# Patient Record
Sex: MALE | Race: WHITE | NOT HISPANIC OR LATINO | Employment: OTHER | URBAN - METROPOLITAN AREA
[De-identification: names, ages, dates, MRNs, and addresses within clinical notes are randomized per-mention and may not be internally consistent; named-entity substitution may affect disease eponyms.]

---

## 2024-03-04 ENCOUNTER — OFFICE VISIT (OUTPATIENT)
Dept: UROLOGY | Facility: CLINIC | Age: 69
End: 2024-03-04
Payer: MEDICARE

## 2024-03-04 ENCOUNTER — LAB VISIT (OUTPATIENT)
Dept: LAB | Facility: HOSPITAL | Age: 69
End: 2024-03-04
Attending: UROLOGY
Payer: MEDICARE

## 2024-03-04 VITALS
HEIGHT: 71 IN | RESPIRATION RATE: 16 BRPM | BODY MASS INDEX: 33.46 KG/M2 | SYSTOLIC BLOOD PRESSURE: 124 MMHG | HEART RATE: 18 BPM | DIASTOLIC BLOOD PRESSURE: 77 MMHG | WEIGHT: 239 LBS

## 2024-03-04 DIAGNOSIS — R31.0 HEMATURIA, GROSS: Primary | ICD-10-CM

## 2024-03-04 DIAGNOSIS — C61 PROSTATE CANCER: ICD-10-CM

## 2024-03-04 DIAGNOSIS — R31.0 HEMATURIA, GROSS: ICD-10-CM

## 2024-03-04 DIAGNOSIS — N39.3 SUI (STRESS URINARY INCONTINENCE), MALE: ICD-10-CM

## 2024-03-04 DIAGNOSIS — N52.31 ERECTILE DYSFUNCTION AFTER RADICAL PROSTATECTOMY: ICD-10-CM

## 2024-03-04 LAB
BILIRUB UR QL STRIP: NEGATIVE
COMPLEXED PSA SERPL-MCNC: <0.01 NG/ML (ref 0–4)
CREAT SERPL-MCNC: 0.9 MG/DL (ref 0.5–1.4)
EST. GFR  (NO RACE VARIABLE): >60 ML/MIN/1.73 M^2
GLUCOSE UR QL STRIP: NEGATIVE
KETONES UR QL STRIP: POSITIVE
LEUKOCYTE ESTERASE UR QL STRIP: NEGATIVE
PH, POC UA: 5.5
POC BLOOD, URINE: NEGATIVE
POC NITRATES, URINE: NEGATIVE
POC RESIDUAL URINE VOLUME: 56 ML (ref 0–100)
PROT UR QL STRIP: POSITIVE
SP GR UR STRIP: 1.03 (ref 1–1.03)
UROBILINOGEN UR STRIP-ACNC: 0.2 (ref 0.3–2.2)

## 2024-03-04 PROCEDURE — 36415 COLL VENOUS BLD VENIPUNCTURE: CPT | Performed by: UROLOGY

## 2024-03-04 PROCEDURE — 99205 OFFICE O/P NEW HI 60 MIN: CPT | Mod: PBBFAC,25 | Performed by: UROLOGY

## 2024-03-04 PROCEDURE — 99999PBSHW POCT BLADDER SCAN: Mod: PBBFAC,,,

## 2024-03-04 PROCEDURE — 51798 US URINE CAPACITY MEASURE: CPT | Mod: PBBFAC | Performed by: UROLOGY

## 2024-03-04 PROCEDURE — 99999PBSHW POCT URINALYSIS, DIPSTICK, AUTOMATED, W/O SCOPE: Mod: PBBFAC,,,

## 2024-03-04 PROCEDURE — 82565 ASSAY OF CREATININE: CPT | Performed by: UROLOGY

## 2024-03-04 PROCEDURE — 81003 URINALYSIS AUTO W/O SCOPE: CPT | Mod: PBBFAC | Performed by: UROLOGY

## 2024-03-04 PROCEDURE — 99214 OFFICE O/P EST MOD 30 MIN: CPT | Mod: S$PBB,,, | Performed by: UROLOGY

## 2024-03-04 PROCEDURE — 84153 ASSAY OF PSA TOTAL: CPT | Performed by: UROLOGY

## 2024-03-04 PROCEDURE — 99999 PR PBB SHADOW E&M-NEW PATIENT-LVL V: CPT | Mod: PBBFAC,,, | Performed by: UROLOGY

## 2024-03-04 RX ORDER — ATORVASTATIN CALCIUM 80 MG/1
80 TABLET, FILM COATED ORAL NIGHTLY
COMMUNITY
Start: 2023-08-28

## 2024-03-04 RX ORDER — GABAPENTIN 300 MG/1
300 CAPSULE ORAL 2 TIMES DAILY
COMMUNITY

## 2024-03-04 RX ORDER — AMOXICILLIN 500 MG/1
500 TABLET, FILM COATED ORAL
COMMUNITY

## 2024-03-04 RX ORDER — VALACYCLOVIR HYDROCHLORIDE 500 MG/1
500 TABLET, FILM COATED ORAL DAILY
COMMUNITY

## 2024-03-04 RX ORDER — NAPROXEN SODIUM 220 MG/1
1 TABLET, FILM COATED ORAL EVERY MORNING
COMMUNITY

## 2024-03-04 RX ORDER — NITROGLYCERIN 0.4 MG/1
0.4 TABLET SUBLINGUAL EVERY 5 MIN PRN
COMMUNITY
Start: 2023-04-27

## 2024-03-04 RX ORDER — HYDROCODONE BITARTRATE AND ACETAMINOPHEN 7.5; 325 MG/1; MG/1
1 TABLET ORAL 2 TIMES DAILY
COMMUNITY

## 2024-03-04 RX ORDER — LANOLIN ALCOHOL/MO/W.PET/CERES
500 CREAM (GRAM) TOPICAL DAILY
COMMUNITY

## 2024-03-04 RX ORDER — ESCITALOPRAM OXALATE 10 MG/1
10 TABLET ORAL NIGHTLY
COMMUNITY

## 2024-03-04 RX ORDER — MAGNESIUM GLUCONATE 27 MG(500)
1000 TABLET ORAL DAILY
COMMUNITY

## 2024-03-04 RX ORDER — PANTOPRAZOLE SODIUM 40 MG/1
40 TABLET, DELAYED RELEASE ORAL DAILY
COMMUNITY

## 2024-03-04 RX ORDER — CHOLECALCIFEROL (VITAMIN D3) 25 MCG
1000 TABLET ORAL DAILY
COMMUNITY

## 2024-03-04 RX ORDER — DICYCLOMINE HYDROCHLORIDE 20 MG/1
20 TABLET ORAL 2 TIMES DAILY
COMMUNITY
Start: 2023-12-28

## 2024-03-04 NOTE — PROGRESS NOTES
Subjective:      Patient ID: Steven Garcia is a 68 y.o. male.    Chief Complaint: Hematuria    He complaints of intermittent gross hematuria for the last few months.  Denies blood clots.  Denies dysuria.  Mild BARBIE that is stable.  He has some back pain but no significant flank pain.  He has ED.  He is currently not sexually active.     He was treated by me with a robotic prostatectomy and robotic bilateral pelvic lymphadenectomy for prostate cancer a few years ago.  He has been TRISTIN since his surgery.  He has history of kidney stones.    Hematuria  Irritative symptoms do not include urgency. Pertinent negatives include no chills, dysuria, fever, flank pain, nausea or vomiting.     Review of Systems   Constitutional:  Negative for chills and fever.   Respiratory:  Negative for shortness of breath.    Cardiovascular:  Negative for chest pain.   Gastrointestinal:  Negative for nausea and vomiting.   Genitourinary:  Positive for hematuria. Negative for difficulty urinating, dysuria, flank pain and urgency.   Musculoskeletal:  Positive for back pain.   Skin:  Negative for rash.   Neurological:  Negative for dizziness.   Psychiatric/Behavioral:  Negative for agitation.       Objective:     Physical Exam  Constitutional:       Appearance: Normal appearance.   Pulmonary:      Effort: Pulmonary effort is normal.   Abdominal:      General: There is no distension.      Palpations: Abdomen is soft.   Neurological:      Mental Status: He is alert and oriented to person, place, and time.   Psychiatric:         Mood and Affect: Mood normal.      Office Visit on 03/04/2024   Component Date Value Ref Range Status    POC Blood, Urine 03/04/2024 Negative  Negative Final    POC Bilirubin, Urine 03/04/2024 Negative  Negative Final    POC Urobilinogen, Urine 03/04/2024 0.2  0.3 - 2.2 Final    POC Ketones, Urine 03/04/2024 Positive (A)  Negative Final    POC Protein, Urine 03/04/2024 Positive (A)  Negative Final    POC Nitrates, Urine  03/04/2024 Negative  Negative Final    POC Glucose, Urine 03/04/2024 Negative  Negative Final    pH, UA 03/04/2024 5.5   Final    POC Specific Gravity, Urine 03/04/2024 1.030 (A)  1.003 - 1.029 Final    POC Leukocytes, Urine 03/04/2024 Negative  Negative Final    POC Residual Urine Volume 03/04/2024 56  0 - 100 mL Final      Results for orders placed or performed in visit on 03/04/24 (from the past 8760 hour(s))   POCT Bladder Scan   Result Value    POC Residual Urine Volume 56      Assessment:      1. Hematuria, gross    2. BARBIE (stress urinary incontinence), male    3. Prostate cancer    4. Erectile dysfunction after radical prostatectomy      Orders Placed This Encounter    CT Urogram Abd Pelvis W WO    PROSTATE SPECIFIC ANTIGEN, DIAGNOSTIC    CREATININE, SERUM    Ambulatory referral/consult to Physical/Occupational Therapy    POCT Urinalysis, Dipstick, Automated, W/O Scope    POCT Bladder Scan      Plan:     - CT urogram followed by cystoscopy in 2 weeks.  - Referral to [facility] floor PT.  - PSA and creatinine/BUN today.  - I prescribed him tri-mix ICI today, I taught him how to do the injections, and he was given written instructions on how to do the penile injections.  I told him to call the office if he needs any further instructions with the penile injections.

## 2024-04-12 ENCOUNTER — TELEPHONE (OUTPATIENT)
Dept: UROLOGY | Facility: CLINIC | Age: 69
End: 2024-04-12
Payer: MEDICARE

## 2024-04-12 DIAGNOSIS — R31.0 GROSS HEMATURIA: Primary | ICD-10-CM

## 2024-04-12 NOTE — TELEPHONE ENCOUNTER
I called and left a detailed message for the patient. I scheduled lab appointment for 4/15/2024.     ----- Message from Thong An MD sent at 4/12/2024  3:24 PM CDT -----  Regarding: RE: STAT Labs for CT  Please call Mr Garcia and let him know that the lab requires a normal creatinine within 30 days of his CT scan.  I placed this creatinine order.  See if he can do the creatinine now or Monday 4-15-24 morning.  Remind him of the details of his CT urogram and cystoscopy.  ----- Message -----  From: Beth Garrett MA  Sent: 4/12/2024  11:22 AM CDT  To: Thong An MD  Subject: FW: STAT Labs for CT                             Need an order for labs.   ----- Message -----  From: Lisa Shukla RT  Sent: 4/12/2024   8:40 AM CDT  To: [facility] Staff  Subject: STAT Labs for CT                                 Mr Garcia will need a STAT CREATININE SERUM ordered and booked at least 1 1/2 hours prior to his CT on Tuesday. If the patient prefers, he can come in any day before then to have them drawn so we will not have to wait on results the day of the scan.         Thanks,   Irene   [id number]

## 2024-04-15 ENCOUNTER — LAB VISIT (OUTPATIENT)
Dept: LAB | Facility: HOSPITAL | Age: 69
End: 2024-04-15
Attending: UROLOGY
Payer: MEDICARE

## 2024-04-15 DIAGNOSIS — R31.0 GROSS HEMATURIA: ICD-10-CM

## 2024-04-15 PROCEDURE — 82565 ASSAY OF CREATININE: CPT | Performed by: UROLOGY

## 2024-04-15 PROCEDURE — 36415 COLL VENOUS BLD VENIPUNCTURE: CPT | Performed by: UROLOGY

## 2024-04-16 ENCOUNTER — HOSPITAL ENCOUNTER (OUTPATIENT)
Dept: RADIOLOGY | Facility: HOSPITAL | Age: 69
Discharge: HOME | End: 2024-04-16
Attending: UROLOGY
Payer: MEDICARE

## 2024-04-16 DIAGNOSIS — R31.0 HEMATURIA, GROSS: ICD-10-CM

## 2024-04-16 LAB
CREAT SERPL-MCNC: 0.9 MG/DL (ref 0.5–1.4)
EST. GFR  (NO RACE VARIABLE): >60 ML/MIN/1.73 M^2

## 2024-04-16 PROCEDURE — 25500020 PHARM REV CODE 255: Performed by: UROLOGY

## 2024-04-16 PROCEDURE — 74178 CT ABD&PLV WO CNTR FLWD CNTR: CPT | Mod: 26,,, | Performed by: RADIOLOGY

## 2024-04-16 PROCEDURE — 74178 CT ABD&PLV WO CNTR FLWD CNTR: CPT | Mod: TC

## 2024-04-16 RX ADMIN — IOHEXOL 100 ML: 350 INJECTION, SOLUTION INTRAVENOUS at 01:04

## 2024-04-22 ENCOUNTER — HOSPITAL ENCOUNTER (OUTPATIENT)
Dept: CARDIOLOGY | Facility: HOSPITAL | Age: 69
Discharge: HOME | End: 2024-04-22
Attending: UROLOGY
Payer: MEDICARE

## 2024-04-22 ENCOUNTER — HOSPITAL ENCOUNTER (OUTPATIENT)
Dept: RADIOLOGY | Facility: HOSPITAL | Age: 69
Discharge: HOME | End: 2024-04-22
Attending: UROLOGY
Payer: MEDICARE

## 2024-04-22 ENCOUNTER — PROCEDURE VISIT (OUTPATIENT)
Dept: UROLOGY | Facility: CLINIC | Age: 69
End: 2024-04-22
Payer: MEDICARE

## 2024-04-22 VITALS
SYSTOLIC BLOOD PRESSURE: 132 MMHG | RESPIRATION RATE: 18 BRPM | DIASTOLIC BLOOD PRESSURE: 78 MMHG | BODY MASS INDEX: 34.11 KG/M2 | WEIGHT: 243.63 LBS | HEIGHT: 71 IN

## 2024-04-22 DIAGNOSIS — Z01.818 PRE-OP EXAM: ICD-10-CM

## 2024-04-22 DIAGNOSIS — D49.4 BLADDER NEOPLASM: Primary | ICD-10-CM

## 2024-04-22 DIAGNOSIS — R31.0 GROSS HEMATURIA: ICD-10-CM

## 2024-04-22 LAB
BILIRUB UR QL STRIP: NEGATIVE
GLUCOSE UR QL STRIP: NEGATIVE
KETONES UR QL STRIP: NEGATIVE
LEUKOCYTE ESTERASE UR QL STRIP: NEGATIVE
OHS QRS DURATION: 106 MS
OHS QTC CALCULATION: 420 MS
PH, POC UA: 5
POC BLOOD, URINE: NEGATIVE
POC NITRATES, URINE: NEGATIVE
PROT UR QL STRIP: POSITIVE
SP GR UR STRIP: >=1.03 (ref 1–1.03)
UROBILINOGEN UR STRIP-ACNC: 0.2 (ref 0.3–2.2)

## 2024-04-22 PROCEDURE — 81003 URINALYSIS AUTO W/O SCOPE: CPT | Mod: PBBFAC | Performed by: UROLOGY

## 2024-04-22 PROCEDURE — 88112 CYTOPATH CELL ENHANCE TECH: CPT | Mod: 26,,, | Performed by: PATHOLOGY

## 2024-04-22 PROCEDURE — 71046 X-RAY EXAM CHEST 2 VIEWS: CPT | Mod: 26,,, | Performed by: RADIOLOGY

## 2024-04-22 PROCEDURE — 71046 X-RAY EXAM CHEST 2 VIEWS: CPT | Mod: TC

## 2024-04-22 PROCEDURE — 93005 ELECTROCARDIOGRAM TRACING: CPT

## 2024-04-22 PROCEDURE — 88112 CYTOPATH CELL ENHANCE TECH: CPT | Performed by: PATHOLOGY

## 2024-04-22 PROCEDURE — 93010 ELECTROCARDIOGRAM REPORT: CPT | Mod: ,,, | Performed by: INTERNAL MEDICINE

## 2024-04-22 PROCEDURE — 99999PBSHW POCT URINALYSIS, DIPSTICK, AUTOMATED, W/O SCOPE: Mod: PBBFAC,,,

## 2024-04-22 PROCEDURE — 52000 CYSTOURETHROSCOPY: CPT | Mod: PBBFAC | Performed by: UROLOGY

## 2024-04-22 RX ORDER — LENALIDOMIDE 5 MG/1
5 CAPSULE ORAL
COMMUNITY

## 2024-04-22 RX ORDER — ALFUZOSIN HYDROCHLORIDE 10 MG/1
10 TABLET, EXTENDED RELEASE ORAL
COMMUNITY

## 2024-04-22 RX ORDER — OMEGA-3-ACID ETHYL ESTERS 1 G/1
1 CAPSULE, LIQUID FILLED ORAL EVERY MORNING
COMMUNITY

## 2024-04-22 NOTE — PROCEDURES
Cystoscopy    Date/Time: 4/22/2024 8:30 AM    Performed by: Thong nA MD  Authorized by: Thong An MD    Consent Done?:  Yes (Verbal) and Yes (Written)  Timeout: prior to procedure the correct patient, procedure, and site was verified    Prep: patient was prepped and draped in usual sterile fashion    Local anesthesia used?: Yes    Anesthesia:  Lidocaine jelly  Local anesthetic:  Topical anesthetic  Indications: hematuria    Position:  Supine  Anesthesia:  Lidocaine jelly  Preparation: Patient was prepped and draped in usual sterile fashion    Scope type:  Flexible cystoscope  Comments:      The flexible cystoscope was advanced through his urethra into his bladder.  The bladder was inspected in a systematic fashion.  His bilateral ureteral orifices are orthotopic and patent with clear efflux of urine.  There are a few small flat erythematous bladder lesions on the posterior wal of his bladder near the floor of his bladder just behind the trigone mostly in the midline.  No papillary tumor is seen.  No stone and no foreign body is seen.  His prostate is a surgically absent.  A bladder wash urine cytology was obtained.  The cystoscope was removed from his bladder.  The patient tolerated this procedure well.          4-16-24  CT urogram.  See report.  Kidneys demonstrate no enhancing mass with cysts present bilaterally including left peripelvic cysts. No hydronephrosis. Prominent right extrarenal pelvis. No nephrolithiasis. No distal urolithiasis. Opacified ureters and renal collecting systems demonstrate no persistent suspicious filling defect. No definite ureteral wall thickening.  Mild bladder base wall thickening which may be accentuated by incomplete distension and prostatectomy. Correlate with direct visualization.     I reviewed all of the above lab results.         3-4-24  PSA <0.01    4-15-24  Cr 0.9    I reviewed all of the above lab results.         Assessment:  - Intermittent gross hematuria  over the last few months.  He says he smoked about 3 years as a teenager.  He takes aspirin 81 mg daily.  - Cystoscopy today on 4-22-24 shows there are a few small flat erythematous bladder lesions on the posterior wal of his bladder near the floor of his bladder just behind the trigone mostly in the midline.    - Prostate cancer s/p a robotic radical prostatectomy and robotic bilateral pelvic lymphadenectomy by me on 9-20-21.  Path:  Prostate adenocarcinoma [facility] 3+4=7, 8% pattern 4, margins involved at the left lateral prostate (unifocal pattern 3 at the positive margin), perineural invasion is present, pT2N0.    - Detectable PSA of 1.440 on 1-5-24.  Pelvic MRI on 2-1-22 showed no soft tissue mass or nodule in the surgical bed.  No evidence for disease recurrence or lymphadenopathy.  PET/CT pylarify scan on 2-16-22 showed a suspicious 9 mm hypermetabolic left pelvic lymph node.  - He underwent pelvic radiation treatments with Dr Donaldson that he finished in May 2022.  He received his first lupron 22.5 mg IM injection on 2-25-22 and his final lupron 22.5 mg IM injection on 6-13-22.  - Mild BARBIE.  - ED.  I prescribed him tri-mix ICI on 3-4-24.  He says he did not get the tri-mix ICI filled because his wife is not going to him to use it.  - CAD.  He has 2 coronary stents.  He takes aspirin 81 mg daily.  Dr Robbie Castillo is his Cardiologist.  - History of a 6 mm right proximal ureteral stone s/p right USE with laser lithotriopsy with right ureteral stent exchange on 2-8-19.  His right ureteral stent was removed on 2-20-9.  Stone analysis on 2-8-19 was 100% uric acid.  - History of kidney stones.    Plan:  - I had a discussion with the patient after his cystoscopy of his findings of few small flat erythematous bladder lesions on the posterior wal of his bladder near the floor of his bladder just behind the trigone mostly in the midline.  I explained that this is likely radiation cystitis changes but that I cannot  completely rule out bladder cancer just on visual appearance.  The mutual decision was made to proceed to the OR for a cystoscopy with bladder biopsies with fulguration, bilateral retrograde pyelograms, possible bilateral ureteral stents placement, and any other indicated procedure.  Risks and benefits of the surgery were explained to the patient and the patient expressed understanding.  All questions were answered by me to the patient's apparent understanding and to the patient's apparent satisfaction.  Surgery consent was signed today by the patient.   - Referral to Dr Robbie Castillo, Cardiologist, for preop cardiac clearance and for clearance to stop blood thinners prior to his surgery.  - I referred him to [facility] floor physical therapy on 3-4-24.  He says he never received a phone call from [facility].  I asked my nurse to send another referred.  - I discussed dietary modifications with him today and I recommended he drink mostly water during the day.

## 2024-04-23 LAB
FINAL PATHOLOGIC DIAGNOSIS: NORMAL
Lab: NORMAL

## 2024-04-29 ENCOUNTER — TELEPHONE (OUTPATIENT)
Dept: PREADMISSION TESTING | Facility: HOSPITAL | Age: 69
End: 2024-04-29
Payer: MEDICARE

## 2024-04-29 NOTE — TELEPHONE ENCOUNTER
Pre op instructions reviewed with pt over telephone, verbalized understanding.    To confirm, Surgery is scheduled on 5/3/24. We will call you late afternoon the business day prior to surgery with your arrival time.    *Please report to the [facility] Lobby (1st Floor) located off of [facility] (2nd Entrance/Building on the left, in front of the flag pole).  Address: [address] [place] [address] [place]. [address]      INSTRUCTIONS IMPORTANT!!!  DO NOT Eat, Drink, or Smoke after 12 midnight unless instructed otherwise by your Surgeon. OK to brush teeth, no gum, candy or mints!    >>>MEDICATION INSTRUCTIONS<<<: Morning of Surgery, take small sip of water with ONLY these medications:  None     !!!STOP ALL Aspirins, NSAIDS, WEIGHT LOSS INJECTIONS/PILLS, Herbal supplements, & Vitamins 7 DAYS BEFORE SURGERY!!!    ____  Avoid Alcoholic beverages 3 days prior to surgery, as it can thin the blood.  ____  NO Acrylic/fake nails or nail polish worn day of surgery (specifically hand/arm & foot surgeries).  ____  NO powder, lotions, deodorants, oils or cream on body.  ____  Remove all jewelry & piercings & foreign objects before arrival & leave at home.  ____  Remove Dentures, Hearing Aids & Contact Lens prior to surgery.  ____  Bring photo ID and insurance information to hospital (Leave Valuables at Home).  ____  If going home the same day, arrange for a ride home. You will not be able to drive for 24 hrs if Anesthesia was used.   ____  Females (ages 11-60): may need to give a urine sample the morning of surgery; please see Pre op Nurse prior to using the restroom.  ____  Males: Stop ED medications (Viagra, Cialis) 24 hrs prior to surgery.  ____  Wear clean, loose fitting clothing to allow for dressings/ bandages.      Bathing Instructions:    -Shower with anti-bacterial Soap (Hibiclens or Dial) the night before surgery and the morning of.   -Do not use Hibiclens on your face or genitals.   -Apply clean clothes after  shower.  -Do not shave your face or body 2 days prior to surgery unless instructed otherwise by your Surgeon.  -Do not shave pubic hair 7 days prior to surgery (gyn pt's).    [facility] Vis[facility]/Ride Policy:  Only 2 adults allowed in pre op/recovery area during your procedure. You MUST HAVE A RIDE HOME from a responsible adult that you know and trust. Medical Transport, Uber or Lyft can ONLY be used if patient has a responsible adult to accompany them during ride home.       *Signs and symptoms of Infection Before or After Surgery:               !!!If you experience any fever, chills, nausea/ vomiting, foul odor/ excessive drainage from surgical site, flu-like symptoms, new wounds or cuts, PLEASE CALL THE SURGEON OFFICE at [phone] or SEND MESSAGE THROUGH [facility]!!!     *If you are running late the morning of surgery, please call the [facility] Surgery Dept @ [phone].     *Billing questions:  [phone]     Thank you,  -[facility] Pre Admit Dept.  [phone] or [phone]  M-F 7:30 am-4:00 pm (Closed Major Holidays)

## 2024-04-30 ENCOUNTER — TELEPHONE (OUTPATIENT)
Dept: UROLOGY | Facility: CLINIC | Age: 69
End: 2024-04-30
Payer: MEDICARE

## 2024-04-30 NOTE — TELEPHONE ENCOUNTER
Referral has been sent for Cardiology already.  I faxed referral to [facility] on 03/05/2024 and refaxed again today 04/30/2024.     ----- Message from Thong An MD sent at 4/22/2024  9:28 AM CDT -----  Please fax a referral to Dr Robbie Castillo, Cardiologist, for preop cardiac clearance and for clearance to stop blood thinners prior to his surgery.  Please sara him with the details of his surgery when he is scheduled.  I referred him to [facility] physical therapy on 3-4-24 for stress urinary incontinence.  He says he never received a phone call from [facility].  Please send another referred.

## 2024-05-02 ENCOUNTER — TELEPHONE (OUTPATIENT)
Dept: PREADMISSION TESTING | Facility: HOSPITAL | Age: 69
End: 2024-05-02
Payer: MEDICARE

## 2024-05-02 ENCOUNTER — TELEPHONE (OUTPATIENT)
Dept: UROLOGY | Facility: CLINIC | Age: 69
End: 2024-05-02
Payer: MEDICARE

## 2024-05-02 ENCOUNTER — ANESTHESIA EVENT (OUTPATIENT)
Dept: SURGERY | Facility: HOSPITAL | Age: 69
End: 2024-05-02
Payer: MEDICARE

## 2024-05-02 NOTE — TELEPHONE ENCOUNTER
"I received patient cardiac clearance, however, Mr. Peña last name is spelled as "Roberto". I tried calling cardiology again to have them fix it and the office was closed. I looked up cardiology office online and it has the facility closes at 4pm.      ----- Message from Thong An MD sent at 5/2/2024 12:38 PM CDT -----  Regarding: RE: Cardiac clearance  Contact: [place] [facility]  I sent a request to my nursing staff at the patient's last office visit for a referral to Dr Robbie Castillo, Cardiologist, for preop cardiac clearance and for clearance to stop blood thinners prior to his surgery.  ----- Message -----  From: Beth Garrett MA  Sent: 5/2/2024  11:57 AM CDT  To: Thong An MD  Subject: Cardiac clearance                                I called and spoke with Ralphyves, I informed her that patient is having a procedure done tomorrow and we would need cardiac clearance. I was informed that the patient recently saw cardiologist but failed to mention he had a procedure scheduled for tomorrow. Mercedes is going to talk with Dr. Castillo and see if patient can be cleared for the procedure for tomorrow.  ----- Message -----  From: Rosalinda Tobin  Sent: 5/2/2024  11:44 AM CDT  To: [facility] Long is requesting a call in regards to patient. Please call her back at  [phone]. Thanks KB  "

## 2024-05-02 NOTE — TELEPHONE ENCOUNTER
Called and spoke with Pt about the following:     Please arrive to [facility] ([facility] at 5:30 am on 5/03/24 for your scheduled procedure.  Address: [address] [place] [address] [place]. [address] (2nd Building on left, 1st Floor Lobby)  >>>NO eating or drinking after midnight unless instructed otherwise by your Surgeon<<<

## 2024-05-02 NOTE — TELEPHONE ENCOUNTER
I called and spoke with Mercedes, I informed her that patient is having a procedure done tomorrow and we would need cardiac clearance. I was informed that the patient recently saw cardiologist but failed to mention he had a procedure scheduled for tomorrow. Mercedes is going to talk with Dr. Castillo and see if patient can be cleared for the procedure for tomorrow.     ----- Message from Rosalinda Tobin sent at 5/2/2024 11:41 AM CDT -----  Contact: Christianne Long is requesting a call in regards to patient. Please call her back at  [phone]. Thanks KB

## 2024-05-02 NOTE — ANESTHESIA PREPROCEDURE EVALUATION
05/02/2024  Steven Garcia is a 69 y.o., male with a past medical history of coronary artery disease status post PCI with thrombectomy and CHANTELLE of the RCA for non-STEMI July 2018 (no significant left system CAD) followed by PTCA of moderate ISR of RCA November 2018, normal LVEF, hyperlipidemia, multiple myeloma in remission, and prostate cancer status post prostatectomy September 2021 followed by XRT in remission     There is no problem list on file for this patient.    Past Surgical History:   Procedure Laterality Date    KIDNEY STONE SURGERY      LIMBAL STEM CELL TRANSPLANT      2016    PROSTATECTOMY      Prostatectomy w/ Pelvic Lymphadenectomy.       Pre-op Assessment    I have reviewed the Patient Summary Reports.    I have reviewed the NPO Status.   I have reviewed the Medications.     Review of Systems  Anesthesia Hx:  No problems with previous Anesthesia                Social:  Former Smoker       Hematology/Oncology:  Hematology Normal                                     Cardiovascular:       Past MI CAD   CABG/stent        hyperlipidemia    Seen by cards 3/24:  Status post PCI with CHANTELLE to RCA for non-STEMI in July 2018 followed by PTCA of moderate ISR of RCA November 2018.  Hospitalization September 2021 for chest pain, ruled out for MI, LHC with stable nonobstructive CAD and patent mid RCA stent.  Remains asymptomatic and EKG stable.  Continue aspirin and high intensity statin Lipitor.  Previously intolerant to beta-blocker.                         Pulmonary:        Sleep Apnea                Renal/:  Renal/ Normal                 Hepatic/GI:  Hepatic/GI Normal                 Neurological:  Neurology Normal                                      Endocrine:  Endocrine Normal                Physical Exam  General: Well nourished    Airway:  Mallampati: II   Mouth Opening: Normal  TM Distance:  Normal  Neck ROM: Normal ROM    Dental:  Intact        Anesthesia Plan  Type of Anesthesia, risks & benefits discussed:    Anesthesia Type: Gen ETT, Gen Supraglottic Airway  Intra-op Monitoring Plan: Standard ASA Monitors  Post Op Pain Control Plan: multimodal analgesia  Induction:  IV  Airway Plan: , Post-Induction  Informed Consent: Informed consent signed with the Patient and all parties understand the risks and agree with anesthesia plan.  All questions answered.   ASA Score: 3    Ready For Surgery From Anesthesia Perspective.     .      Chemistry        Component Value Date/Time     04/22/2024 0949    K 4.4 04/22/2024 0949     04/22/2024 0949    CO2 29 04/22/2024 0949    BUN 18 04/22/2024 0949    CREATININE 0.9 04/22/2024 0949     04/22/2024 0949        Component Value Date/Time    CALCIUM 9.4 04/22/2024 0949    ALKPHOS 74 04/22/2024 0949    AST 27 04/22/2024 0949    ALT 44 04/22/2024 0949    BILITOT 0.7 04/22/2024 0949        Lab Results   Component Value Date    WBC 5.38 04/22/2024    HGB 15.4 04/22/2024    HCT 44.5 04/22/2024    MCV 94 04/22/2024     (L) 04/22/2024       Normal sinus rhythm   Left axis deviation   Moderate voltage criteria for LVH, may be normal variant ( R in aVL ,   William product )   T wave abnormality, consider lateral ischemia   Abnormal ECG   No previous ECGs available   Confirmed by Beka Moffett MD ([id number]) on 4/22/2024 6:26:52 PM

## 2024-05-03 ENCOUNTER — TELEPHONE (OUTPATIENT)
Dept: UROLOGY | Facility: CLINIC | Age: 69
End: 2024-05-03
Payer: MEDICARE

## 2024-05-03 ENCOUNTER — ANESTHESIA (OUTPATIENT)
Dept: SURGERY | Facility: HOSPITAL | Age: 69
End: 2024-05-03
Payer: MEDICARE

## 2024-05-03 ENCOUNTER — HOSPITAL ENCOUNTER (OUTPATIENT)
Facility: HOSPITAL | Age: 69
Discharge: HOME | End: 2024-05-03
Attending: UROLOGY | Admitting: UROLOGY
Payer: MEDICARE

## 2024-05-03 DIAGNOSIS — D49.4 BLADDER NEOPLASM: ICD-10-CM

## 2024-05-03 PROCEDURE — 37000008 HC ANESTHESIA 1ST 15 MINUTES: Performed by: UROLOGY

## 2024-05-03 PROCEDURE — 74420 UROGRAPHY RTRGR +-KUB: CPT | Mod: 26,,, | Performed by: UROLOGY

## 2024-05-03 PROCEDURE — 25000003 PHARM REV CODE 250: Performed by: UROLOGY

## 2024-05-03 PROCEDURE — 36000707: Performed by: UROLOGY

## 2024-05-03 PROCEDURE — C1758 CATHETER, URETERAL: HCPCS | Performed by: UROLOGY

## 2024-05-03 PROCEDURE — 71000015 HC POSTOP RECOV 1ST HR: Performed by: UROLOGY

## 2024-05-03 PROCEDURE — 52204 CYSTOSCOPY W/BIOPSY(S): CPT | Mod: ,,, | Performed by: UROLOGY

## 2024-05-03 PROCEDURE — 25000003 PHARM REV CODE 250: Performed by: NURSE ANESTHETIST, CERTIFIED REGISTERED

## 2024-05-03 PROCEDURE — 88305 TISSUE EXAM BY PATHOLOGIST: CPT | Mod: 26,,, | Performed by: PATHOLOGY

## 2024-05-03 PROCEDURE — 36000706: Performed by: UROLOGY

## 2024-05-03 PROCEDURE — 71000033 HC RECOVERY, INTIAL HOUR: Performed by: UROLOGY

## 2024-05-03 PROCEDURE — 25500020 PHARM REV CODE 255: Performed by: UROLOGY

## 2024-05-03 PROCEDURE — 88305 TISSUE EXAM BY PATHOLOGIST: CPT | Performed by: PATHOLOGY

## 2024-05-03 PROCEDURE — 63600175 PHARM REV CODE 636 W HCPCS: Performed by: NURSE ANESTHETIST, CERTIFIED REGISTERED

## 2024-05-03 PROCEDURE — 37000009 HC ANESTHESIA EA ADD 15 MINS: Performed by: UROLOGY

## 2024-05-03 PROCEDURE — 63600175 PHARM REV CODE 636 W HCPCS: Performed by: UROLOGY

## 2024-05-03 RX ORDER — ONDANSETRON HYDROCHLORIDE 2 MG/ML
4 INJECTION, SOLUTION INTRAVENOUS DAILY PRN
Status: DISCONTINUED | OUTPATIENT
Start: 2024-05-03 | End: 2024-05-03 | Stop reason: HOSPADM

## 2024-05-03 RX ORDER — DEXAMETHASONE SODIUM PHOSPHATE 4 MG/ML
INJECTION, SOLUTION INTRA-ARTICULAR; INTRALESIONAL; INTRAMUSCULAR; INTRAVENOUS; SOFT TISSUE
Status: DISCONTINUED | OUTPATIENT
Start: 2024-05-03 | End: 2024-05-03

## 2024-05-03 RX ORDER — LIDOCAINE HYDROCHLORIDE 20 MG/ML
JELLY TOPICAL
Status: DISCONTINUED | OUTPATIENT
Start: 2024-05-03 | End: 2024-05-03 | Stop reason: HOSPADM

## 2024-05-03 RX ORDER — FENTANYL CITRATE 50 UG/ML
INJECTION, SOLUTION INTRAMUSCULAR; INTRAVENOUS
Status: DISCONTINUED | OUTPATIENT
Start: 2024-05-03 | End: 2024-05-03

## 2024-05-03 RX ORDER — HYDROMORPHONE HYDROCHLORIDE 2 MG/ML
0.2 INJECTION, SOLUTION INTRAMUSCULAR; INTRAVENOUS; SUBCUTANEOUS EVERY 5 MIN PRN
Status: DISCONTINUED | OUTPATIENT
Start: 2024-05-03 | End: 2024-05-03 | Stop reason: HOSPADM

## 2024-05-03 RX ORDER — PHENAZOPYRIDINE HYDROCHLORIDE 100 MG/1
100 TABLET, FILM COATED ORAL 3 TIMES DAILY PRN
Qty: 20 TABLET | Refills: 0 | Status: SHIPPED | OUTPATIENT
Start: 2024-05-03 | End: 2024-05-13

## 2024-05-03 RX ORDER — ROCURONIUM BROMIDE 10 MG/ML
INJECTION, SOLUTION INTRAVENOUS
Status: DISCONTINUED | OUTPATIENT
Start: 2024-05-03 | End: 2024-05-03

## 2024-05-03 RX ORDER — MIDAZOLAM HYDROCHLORIDE 1 MG/ML
INJECTION INTRAMUSCULAR; INTRAVENOUS
Status: DISCONTINUED | OUTPATIENT
Start: 2024-05-03 | End: 2024-05-03

## 2024-05-03 RX ORDER — KETOROLAC TROMETHAMINE 30 MG/ML
15 INJECTION, SOLUTION INTRAMUSCULAR; INTRAVENOUS EVERY 8 HOURS PRN
Status: DISCONTINUED | OUTPATIENT
Start: 2024-05-03 | End: 2024-05-03 | Stop reason: HOSPADM

## 2024-05-03 RX ORDER — PROPOFOL 10 MG/ML
VIAL (ML) INTRAVENOUS
Status: DISCONTINUED | OUTPATIENT
Start: 2024-05-03 | End: 2024-05-03

## 2024-05-03 RX ORDER — OXYCODONE AND ACETAMINOPHEN 5; 325 MG/1; MG/1
1 TABLET ORAL
Status: DISCONTINUED | OUTPATIENT
Start: 2024-05-03 | End: 2024-05-03 | Stop reason: HOSPADM

## 2024-05-03 RX ORDER — LIDOCAINE HYDROCHLORIDE 20 MG/ML
INJECTION INTRAVENOUS
Status: DISCONTINUED | OUTPATIENT
Start: 2024-05-03 | End: 2024-05-03

## 2024-05-03 RX ORDER — ONDANSETRON HYDROCHLORIDE 2 MG/ML
INJECTION, SOLUTION INTRAVENOUS
Status: DISCONTINUED | OUTPATIENT
Start: 2024-05-03 | End: 2024-05-03

## 2024-05-03 RX ADMIN — MIDAZOLAM HYDROCHLORIDE 2 MG: 1 INJECTION, SOLUTION INTRAMUSCULAR; INTRAVENOUS at 07:05

## 2024-05-03 RX ADMIN — ROCURONIUM BROMIDE 50 MG: 10 INJECTION, SOLUTION INTRAVENOUS at 07:05

## 2024-05-03 RX ADMIN — PROPOFOL 100 MG: 10 INJECTION, EMULSION INTRAVENOUS at 07:05

## 2024-05-03 RX ADMIN — SODIUM CHLORIDE, SODIUM LACTATE, POTASSIUM CHLORIDE, AND CALCIUM CHLORIDE: .6; .31; .03; .02 INJECTION, SOLUTION INTRAVENOUS at 07:05

## 2024-05-03 RX ADMIN — CEFTRIAXONE SODIUM 1 G: 1 INJECTION, POWDER, FOR SOLUTION INTRAMUSCULAR; INTRAVENOUS at 07:05

## 2024-05-03 RX ADMIN — SUGAMMADEX 200 MG: 100 INJECTION, SOLUTION INTRAVENOUS at 07:05

## 2024-05-03 RX ADMIN — DEXAMETHASONE SODIUM PHOSPHATE 8 MG: 4 INJECTION, SOLUTION INTRA-ARTICULAR; INTRALESIONAL; INTRAMUSCULAR; INTRAVENOUS; SOFT TISSUE at 07:05

## 2024-05-03 RX ADMIN — FENTANYL CITRATE 100 MCG: 50 INJECTION, SOLUTION INTRAMUSCULAR; INTRAVENOUS at 07:05

## 2024-05-03 RX ADMIN — ONDANSETRON 4 MG: 2 INJECTION INTRAMUSCULAR; INTRAVENOUS at 07:05

## 2024-05-03 RX ADMIN — LIDOCAINE HYDROCHLORIDE 100 MG: 20 INJECTION INTRAVENOUS at 07:05

## 2024-05-03 NOTE — ANESTHESIA PROCEDURE NOTES
Intubation    Date/Time: 5/3/2024 7:18 AM    Performed by: Jayden Nunez CRNA  Authorized by: Yann Abdi II, MD    Intubation:     Induction:  Intravenous    Intubated:  Postinduction    Mask Ventilation:  Easy mask    Attempts:  1    Attempted By:  CRNA    Method of Intubation:  Direct    Blade:  Manoj 3    Laryngeal View Grade: Grade IIA - cords partially seen      Difficult Airway Encountered?: No      Complications:  None    Airway Device:  Oral endotracheal tube    Airway Device Size:  7.5    Style/Cuff Inflation:  Cuffed (inflated to minimal occlusive pressure)    Inflation Amount (mL):  8    Tube secured:  22    Secured at:  The lips    Placement Verified By:  Capnometry    Complicating Factors:  None    Findings Post-Intubation:  BS equal bilateral

## 2024-05-03 NOTE — TELEPHONE ENCOUNTER
Patient wife was aware of pyridium.     ----- Message from Sarah Buchananry sent at 5/3/2024  9:54 AM CDT -----  .Type:  Patient Call Back    Who Called: PT WIFE       Does the patient know what this is regarding?: SHE CALLED STATING THE PHARMACY HASN'T RECEIVED PT PRESCRIPTIONS. PLEASE ADVISE     Would the patient rather a call back YES     Best Call Back Number: [phone]     Additional Information:     [id number] - [place] - [address]   Phone: [phone]  Fax: [phone]    Thank You

## 2024-05-03 NOTE — TRANSFER OF CARE
"Anesthesia Transfer of Care Note    Patient: Steven Garcia    Procedure(s) Performed: Procedure(s) (LRB):  CYSTOSCOPY, WITH BLADDER BIOPSY, WITH FULGURATION IF INDICATED (Bilateral)  CYSTOSCOPY, WITH RETROGRADE PYELOGRAM (Bilateral)    Patient location: PACU    Anesthesia Type: general    Transport from OR: Transported from OR on room air with adequate spontaneous ventilation    Post pain: adequate analgesia    Post assessment: no apparent anesthetic complications    Post vital signs: stable    Level of consciousness: awake    Nausea/Vomiting: no nausea/vomiting    Complications: none    Transfer of care protocol was followed      Last vitals: Visit Vitals  BP (!) 145/83   Pulse (!) 58   Temp 36.2 °C (97.2 °F) (Temporal)   Resp 18   Ht 5' 11" (1.803 m)   Wt 111.1 kg (245 lb)   SpO2 97%   BMI 34.17 kg/m²     "

## 2024-05-03 NOTE — ANESTHESIA POSTPROCEDURE EVALUATION
Anesthesia Post Evaluation    Patient: Steven Garcia    Procedure(s) Performed: Procedure(s) (LRB):  CYSTOSCOPY, WITH BLADDER BIOPSY, WITH FULGURATION IF INDICATED (Bilateral)  CYSTOSCOPY, WITH RETROGRADE PYELOGRAM (Bilateral)    Final Anesthesia Type: general      Patient location during evaluation: PACU  Patient participation: Yes- Able to Participate  Level of consciousness: awake and alert  Post-procedure vital signs: reviewed and stable  Pain management: adequate  Airway patency: patent  ELYSE mitigation strategies: Verification of full reversal of neuromuscular block  PONV status at discharge: No PONV  Anesthetic complications: no      Cardiovascular status: hemodynamically stable  Respiratory status: spontaneous ventilation  Hydration status: euvolemic  Follow-up not needed.              Vitals Value Taken Time   /61 05/03/24 0819   Temp 36.3 °C (97.3 °F) 05/03/24 0818   Pulse 68 05/03/24 0820   Resp 15 05/03/24 0819   SpO2 92 % 05/03/24 0820   Vitals shown include unfiled device data.      Event Time   Out of Recovery 08:22:00         Pain/Yonis Score: Yonis Score: 10 (5/3/2024  8:15 AM)

## 2024-05-03 NOTE — OP NOTE
Surgeon:  Dr Thong An.    Date:  5-3-2024.    Pre operative diagnosis:  1) Bladder lesion.  2) Gross hematuria.    Post operative diagnosis:  1) Bladder lesion.  2) Gross hematuria.     Surgery:  1) Cystoscopy with bladder biopsies with fulguration.  2) Bilateral retrograde pyelograms.    Anesthesia:  General.    Estimated blood loss:  1 ml.    Complications:  None.    Specimens:  Bladder lesion for permanent specimen.    Procedure in details:  Risks and benefits of the surgery were explained to the patient prior to the surgery and the patient expressed understanding.  All questions were answered by me to the patient's apparent understanding and to the patient's apparent satisfaction.  Surgery consent was signed by the patient prior to the surgery.  The patient was taken to the OR and placed in the supine position initially.  Anesthesia was administered by the Anesthesia team.  The patient was then placed in the dorsal lithotomy position.  The patient was prepped and drapped in the usual sterile fashion.  An IV antibiotic was given to the patient prior to the surgery.  A timeout was done prior to the surgery.  A 21 F rigid cystoscope was advanced through the urethra into his bladder.  The bladder was inspected in a systematic fashion.   His bilateral ureteral orifices are orthotopic and patent with clear efflux.  An approximately 1.5 cm flat erythematous lesion was seen in the midline of the posterior wall of his bladder.  No urothelial lesion, tumor, stone, or foreign body was seen.  I used the biopsy forceps and I performed three bladder biopsies of this flat erythematous lesion was seen in the midline of the posterior wall of his bladder.  Once this bladder lesion was no longer seen visually, I used the bugbee to fulgurate this bladder biopsy site area.  I turned down the flow of fluid and there was no bleeding from his bladder biopsy site.  No visible tumor or abnormal urothelial lesion was seen at this time.   His right ureteral orifice was cannulated with a 5 F open ended ureteral catheter and a gentle right retrograde pyelogram was shot.  This was normal with no filling defect, no hydronephrosis, no extravasation, and good drainage of his right kidney.  His left ureteral orifice was cannulated with a 5 F open ended ureteral catheter and a gentle left retrograde pyelogram was shot.  This was normal with no filling defect, no hydronephrosis, no extravasation, and good drainage of his left kidney.  I checked the bladder biopsy site area again and there was no bleeding from this area or another other area.  The cystoscope was then used to completely drain his bladder and the cystoscope was removed from his body.  A post operative timeout was done at the end of the procedure.  The patient was taken to the recovery room in stable condition at the end of the procedure.

## 2024-05-06 VITALS
RESPIRATION RATE: 18 BRPM | SYSTOLIC BLOOD PRESSURE: 122 MMHG | TEMPERATURE: 97 F | WEIGHT: 245 LBS | OXYGEN SATURATION: 94 % | DIASTOLIC BLOOD PRESSURE: 60 MMHG | BODY MASS INDEX: 34.3 KG/M2 | HEART RATE: 68 BPM | HEIGHT: 71 IN

## 2024-05-07 LAB
FINAL PATHOLOGIC DIAGNOSIS: NORMAL
GROSS: NORMAL
Lab: NORMAL

## 2024-05-09 RX ORDER — PHENAZOPYRIDINE HYDROCHLORIDE 100 MG/1
100 TABLET, FILM COATED ORAL 3 TIMES DAILY PRN
Qty: 30 TABLET | Refills: 1 | Status: SHIPPED | OUTPATIENT
Start: 2024-05-09 | End: 2024-05-19

## 2024-05-15 ENCOUNTER — OFFICE VISIT (OUTPATIENT)
Dept: UROLOGY | Facility: CLINIC | Age: 69
End: 2024-05-15
Payer: MEDICARE

## 2024-05-15 VITALS
HEIGHT: 71 IN | RESPIRATION RATE: 16 BRPM | SYSTOLIC BLOOD PRESSURE: 130 MMHG | WEIGHT: 244.94 LBS | DIASTOLIC BLOOD PRESSURE: 76 MMHG | HEART RATE: 64 BPM | BODY MASS INDEX: 34.29 KG/M2

## 2024-05-15 DIAGNOSIS — N39.3 SUI (STRESS URINARY INCONTINENCE), MALE: ICD-10-CM

## 2024-05-15 DIAGNOSIS — C61 PROSTATE CANCER: ICD-10-CM

## 2024-05-15 DIAGNOSIS — N30.40 RADIATION CYSTITIS: ICD-10-CM

## 2024-05-15 DIAGNOSIS — R31.0 INTERMITTENT GROSS HEMATURIA: Primary | ICD-10-CM

## 2024-05-15 PROCEDURE — 99214 OFFICE O/P EST MOD 30 MIN: CPT | Mod: S$PBB,,, | Performed by: UROLOGY

## 2024-05-15 PROCEDURE — 99214 OFFICE O/P EST MOD 30 MIN: CPT | Mod: PBBFAC | Performed by: UROLOGY

## 2024-05-15 PROCEDURE — 99999 PR PBB SHADOW E&M-EST. PATIENT-LVL IV: CPT | Mod: PBBFAC,,, | Performed by: UROLOGY

## 2024-05-15 NOTE — PROGRESS NOTES
Subjective:       Patient ID: Steven Garcia is a 69 y.o. male.    Chief Complaint: Post-op Evaluation      History of Present Illness:     Mr Garcia is here today with his wife.  He has had ntermittent gross hematuria over the last few months.  He says he has a small amount of gross hematuria 3 days ago but none since.  He takes aspirin 81 mg daily.  He is s/p cystoscopy with bladder biopsies with fulguration of an approximately 1.5 cm flat erythematous lesion was seen in the midline of the posterior wall of his bladder and normal bilateral retrograde pyelograms on 5-3-24.  Pathology showed atypical urothelial mucosa with chronic inflammation and partial denudation, favor reactive changes Muscularis propria is present.    Mr Garcia is s/p a robotic radical prostatectomy and robotic bilateral pelvic lymphadenectomy by me on 9-20-21.  He had a detectable PSA of 1.440 on 1-5-24.  Pelvic MRI on 2-1-22 showed no soft tissue mass or nodule in the surgical bed.  No evidence for disease recurrence or lymphadenopathy.  PET/CT pylarify scan on 2-16-22 showed a suspicious 9 mm hypermetabolic left pelvic lymph node.  He underwent pelvic radiation treatments with Dr Donaldson that he finished in May 2022.  He received his first lupron 22.5 mg IM injection on 2-25-22 and his final lupron 22.5 mg IM injection on 6-13-22.  He has mild BARBIE.  He goes through 2 diaper daily.  He says he has an appointment at [facility] pelvic floor physical therapy.         Past Medical History:   Diagnosis Date    CAD (coronary artery disease)     Cancer     jaw    Encounter for blood transfusion     platelets    Heart attack     Had one in July, one in November 2016 or 2017.    History of heart artery stent     Currently has two stents and a balloon.    HLD (hyperlipidemia)     Hx of cholecystectomy     Had when he was 17 yrs old    Multiple myeloma     radiation treated/ chemo    Sleep apnea      No family history on file.  Social History      Socioeconomic History    Marital status:    Tobacco Use    Smoking status: Former     Types: Cigarettes    Smokeless tobacco: Never    Tobacco comments:     Stopped smoking 18 years ago.    Substance and Sexual Activity    Alcohol use: Not Currently     Comment: Drinks 2 margaritas    Drug use: Never    Sexual activity: Not Currently     Outpatient Encounter Medications as of 5/15/2024   Medication Sig Dispense Refill    alfuzosin (UROXATRAL) 10 mg Tb24 Take 10 mg by mouth daily with breakfast.      amoxicillin (AMOXIL) 500 MG Tab Take 500 mg by mouth. Takes it every 3 to 4 months prior to dental appointment      aspirin 81 MG Chew Take 1 tablet by mouth every morning.      atorvastatin (LIPITOR) 80 MG tablet Take 80 mg by mouth every evening.      cyanocobalamin (VITAMIN B-12) 1000 MCG tablet Take 500 mcg by mouth once daily.      dicyclomine (BENTYL) 20 mg tablet Take 20 mg by mouth 2 (two) times daily.      EScitalopram oxalate (LEXAPRO) 10 MG tablet Take 10 mg by mouth every evening.      gabapentin (NEURONTIN) 300 MG capsule Take 300 mg by mouth 2 (two) times daily.      HYDROcodone-acetaminophen (NORCO) 7.5-325 mg per tablet Take 1 tablet by mouth 2 (two) times a day.      lenalidomide 5 mg Cap Take 5 mg by mouth.      magnesium gluconate (MAG-G) 27 mg magnesium (500 mg) Tab tablet Take 1,000 mg by mouth once daily.      nitroGLYCERIN (NITROSTAT) 0.4 MG SL tablet Place 0.4 mg under the tongue every 5 (five) minutes as needed.      omega-3 acid ethyl esters (LOVAZA) 1 gram capsule Take 1 capsule by mouth every morning.      pantoprazole (PROTONIX) 40 MG tablet Take 40 mg by mouth once daily.      phenazopyridine (PYRIDIUM) 100 MG tablet Take 1 tablet (100 mg total) by mouth 3 (three) times daily as needed for Pain (Take 1 by mouth every 8 hours as needed for burning with urination). 30 tablet 1    valACYclovir (VALTREX) 500 MG tablet Take 500 mg by mouth once daily.      vitamin D (VITAMIN D3) 1000  "units Tab Take 1,000 Units by mouth once daily.      [] phenazopyridine (PYRIDIUM) 100 MG tablet Take 1 by mouth every 8 hours as needed for burning and/or pain with urination and/or bladder spasms). 20 tablet 0    [] phenazopyridine (PYRIDIUM) 100 MG tablet Take 1 tablet (100 mg total) by mouth 3 (three) times daily as needed for Pain (Take 1 by mouth every 8 hours as needed for burning and/or pain with urination and/or bladder spasms.). 20 tablet 0     No facility-administered encounter medications on file as of 5/15/2024.        Review of Systems   Constitutional:  Negative for chills and fever.   Respiratory:  Negative for shortness of breath.    Cardiovascular:  Negative for chest pain.   Gastrointestinal:  Negative for nausea and vomiting.   Genitourinary:  Positive for hematuria. Negative for difficulty urinating.   Musculoskeletal:  Negative for back pain.   Skin:  Negative for rash.   Neurological:  Negative for dizziness.   Psychiatric/Behavioral:  Negative for agitation.        Objective:     /76 (BP Location: Right arm, Patient Position: Sitting)   Pulse 64   Resp 16   Ht 5' 11" (1.803 m)   Wt 111.1 kg (244 lb 14.9 oz)   BMI 34.16 kg/m²     Physical Exam  Constitutional:       Appearance: Normal appearance.   Pulmonary:      Effort: Pulmonary effort is normal.   Abdominal:      Palpations: Abdomen is soft.   Neurological:      Mental Status: He is alert and oriented to person, place, and time.   Psychiatric:         Mood and Affect: Mood normal.         No visits with results within 1 Day(s) from this visit.   Latest known visit with results is:   Admission on 2024, Discharged on 2024   Component Date Value Ref Range Status    Final Pathologic Diagnosis 2024    Final                    Value:Bladder, posterior wall, lesion, biopsy:  - Atypical urothelial mucosa with chronic inflammation and partial denudation, favor reactive changes  - Muscularis propria is " present   - Multiple additional levels have been examined      Interp By GÉNESIS Harmon MD, Signed on 05/07/2024 at 11:11    Gross 05/03/2024    Final                    Value:Surgery ID:  [id number]   Pathology ID:  [id number]  1. Received in formalin labeled &quot;posterior bladder wall&quot; are 3 pink-red tissue fragments each measuring 0.2 cm in greatest dimension.  The specimen is submitted entirely in cassette 1A.    MLP--1-A        Grossed by: Belinda Chamberlain MS, PA([facility])      Disclaimer 05/03/2024 Unless the case is a 'gross only' or additional testing only, the final diagnosis for each specimen is based on a microscopic examination of appropriate tissue sections.   Final        Results for orders placed or performed in visit on 03/04/24 (from the past 8760 hour(s))   POCT Bladder Scan   Result Value    POC Residual Urine Volume 56        Assessment:       1. Intermittent gross hematuria    2. Radiation cystitis    3. BARBIE (stress urinary incontinence), male    4. Prostate cancer      Plan:     Orders Placed This Encounter    PROSTATE SPECIFIC ANTIGEN, DIAGNOSTIC             4-16-24  CT urogram.  See report.  Kidneys demonstrate no enhancing mass with cysts present bilaterally including left peripelvic cysts. No hydronephrosis. Prominent right extrarenal pelvis. No nephrolithiasis. No distal urolithiasis. Opacified ureters and renal collecting systems demonstrate no persistent suspicious filling defect. No definite ureteral wall thickening.  Mild bladder base wall thickening which may be accentuated by incomplete distension and prostatectomy. Correlate with direct visualization.      I reviewed all of the above lab results.            3-4-24  PSA <0.01     4-15-24  Cr 0.9     I reviewed all of the above lab results.         Assessment:  - Intermittent gross hematuria over the last few months.  He says he smoked about 3 years as a teenager.  He takes aspirin 81 mg daily.  - S/p cystoscopy with bladder  biopsies with fulguration of an approximately 1.5 cm flat erythematous lesion was seen in the midline of the posterior wall of his bladder and normal bilateral retrograde pyelograms on 5-3-24.  Path:  Atypical urothelial mucosa with chronic inflammation and partial denudation, favor reactive changes Muscularis propria is present.  - Prostate cancer s/p a robotic radical prostatectomy and robotic bilateral pelvic lymphadenectomy by me on 9-20-21.  Path:  Prostate adenocarcinoma thomas 3+4=7, 8% pattern 4, margins involved at the left lateral prostate (unifocal pattern 3 at the positive margin), perineural invasion is present, pT2N0.    - Detectable PSA of 1.440 on 1-5-24.  Pelvic MRI on 2-1-22 showed no soft tissue mass or nodule in the surgical bed.  No evidence for disease recurrence or lymphadenopathy.  PET/CT pylarify scan on 2-16-22 showed a suspicious 9 mm hypermetabolic left pelvic lymph node.  - He underwent pelvic radiation treatments with Dr Donaldson that he finished in May 2022.  He received his first lupron 22.5 mg IM injection on 2-25-22 and his final lupron 22.5 mg IM injection on 6-13-22.  - Mild BARBIE.  He goes through 2 diaper daily.  - ED.  I prescribed him tri-mix ICI on 3-4-24.  He says he did not get the tri-mix ICI filled because his wife is not going to let him use it.  - CAD.  He has 2 coronary stents.  He takes aspirin 81 mg daily.  Dr Robbie Castillo is his Cardiologist.  - History of a 6 mm right proximal ureteral stone s/p right USE with laser lithotriopsy with right ureteral stent exchange on 2-8-19.  His right ureteral stent was removed on 2-20-9.  Stone analysis on 2-8-19 was 100% uric acid.  - History of kidney stones.    Plan:  - He says he has an appointment at [facility] pelvic floor physical therapy for his BARBIE and I encouraged him to keep this appointment.  - I discussed dietary modifications with him today and I recommended he drink mostly water during the day.   - PSA in 4  months a few days prior to his 4 month appointment.

## 2024-05-28 ENCOUNTER — TELEPHONE (OUTPATIENT)
Dept: UROLOGY | Facility: CLINIC | Age: 69
End: 2024-05-28
Payer: MEDICARE

## 2024-05-28 NOTE — TELEPHONE ENCOUNTER
Returned call to pt; states he has been having hematuria over the weekend and requested an appt; appt scheduled.

## 2024-05-29 ENCOUNTER — OFFICE VISIT (OUTPATIENT)
Dept: UROLOGY | Facility: CLINIC | Age: 69
End: 2024-05-29
Payer: MEDICARE

## 2024-05-29 VITALS
SYSTOLIC BLOOD PRESSURE: 130 MMHG | RESPIRATION RATE: 16 BRPM | BODY MASS INDEX: 34.29 KG/M2 | DIASTOLIC BLOOD PRESSURE: 76 MMHG | HEIGHT: 71 IN | HEART RATE: 64 BPM | WEIGHT: 244.94 LBS

## 2024-05-29 DIAGNOSIS — N39.3 SUI (STRESS URINARY INCONTINENCE), MALE: ICD-10-CM

## 2024-05-29 DIAGNOSIS — N30.01 ACUTE CYSTITIS WITH HEMATURIA: Primary | ICD-10-CM

## 2024-05-29 DIAGNOSIS — R31.0 INTERMITTENT GROSS HEMATURIA: ICD-10-CM

## 2024-05-29 LAB
BILIRUB UR QL STRIP: NEGATIVE
GLUCOSE UR QL STRIP: NEGATIVE
KETONES UR QL STRIP: NEGATIVE
LEUKOCYTE ESTERASE UR QL STRIP: POSITIVE
PH, POC UA: 5
POC BLOOD, URINE: POSITIVE
POC NITRATES, URINE: NEGATIVE
PROT UR QL STRIP: POSITIVE
SP GR UR STRIP: >=1.03 (ref 1–1.03)
UROBILINOGEN UR STRIP-ACNC: 0.2 (ref 0.3–2.2)

## 2024-05-29 PROCEDURE — 99214 OFFICE O/P EST MOD 30 MIN: CPT | Mod: S$PBB,,, | Performed by: UROLOGY

## 2024-05-29 PROCEDURE — 99999 PR PBB SHADOW E&M-EST. PATIENT-LVL III: CPT | Mod: PBBFAC,,, | Performed by: UROLOGY

## 2024-05-29 PROCEDURE — 99999PBSHW POCT URINALYSIS, DIPSTICK, AUTOMATED, W/O SCOPE: Mod: PBBFAC,,,

## 2024-05-29 PROCEDURE — 99213 OFFICE O/P EST LOW 20 MIN: CPT | Mod: PBBFAC | Performed by: UROLOGY

## 2024-05-29 PROCEDURE — 81003 URINALYSIS AUTO W/O SCOPE: CPT | Mod: PBBFAC | Performed by: UROLOGY

## 2024-05-29 NOTE — PROGRESS NOTES
Subjective:       Patient ID: Steven Garcia is a 69 y.o. male.    Chief Complaint: Hematuria      History of Present Illness:     Mr Garcia says he has mild intermittent gross hematuria that he says has improved since his cystoscopy with bladder biopsies with fulguration procedure on 5-3-24.  He says he was at a business on Saturday 5-25-24 and he voided in this public restroom and his urine looked purple.  He says his urine was yellow after this 1 time event.  No dysuria.  Mild intermittent suprapubic discomfort.  No increased frequency and urgency.  No significant odor associated with his urine.  He has mild BARBIE.  He goes through 2 diaper daily.  He takes aspirin 81 mg daily.    Mr Garcia is s/p cystoscopy with bladder biopsies with fulguration of an approximately 1.5 cm flat erythematous lesion was seen in the midline of the posterior wall of his bladder and normal bilateral retrograde pyelograms on 5-3-24.  Path:  Atypical urothelial mucosa with chronic inflammation and partial denudation, favor reactive changes Muscularis propria is present.    He has a history of prostate cancer s/p a robotic radical prostatectomy and robotic bilateral pelvic lymphadenectomy by me on 9-20-21.  Path:  Prostate adenocarcinoma thomas 3+4=7, 8% pattern 4, margins involved at the left lateral prostate (unifocal pattern 3 at the positive margin), perineural invasion is present, pT2N0.  He had a detectable PSA of 1.440 on 1-5-24.  Pelvic MRI on 2-1-22 showed no soft tissue mass or nodule in the surgical bed.  No evidence for disease recurrence or lymphadenopathy.  PET/CT pylarify scan on 2-16-22 showed a suspicious 9 mm hypermetabolic left pelvic lymph node.  He underwent pelvic radiation treatments with Dr Donaldson that he finished in May 2022.  He received his first lupron 22.5 mg IM injection on 2-25-22 and his final lupron 22.5 mg IM injection on 6-13-22.    Hematuria  Irritative symptoms do not include frequency or urgency.  Pertinent negatives include no chills, dysuria, fever, nausea or vomiting.        Past Medical History:   Diagnosis Date    CAD (coronary artery disease)     Cancer     jaw    Encounter for blood transfusion     platelets    Heart attack     Had one in July, one in November 2016 or 2017.    History of heart artery stent     Currently has two stents and a balloon.    HLD (hyperlipidemia)     Hx of cholecystectomy     Had when he was 17 yrs old    Multiple myeloma     radiation treated/ chemo    Sleep apnea      Family History   Problem Relation Name Age of Onset    No Known Problems Father      No Known Problems Mother      No Known Problems Maternal Aunt      No Known Problems Maternal Uncle      No Known Problems Paternal Aunt      No Known Problems Paternal Uncle      No Known Problems Paternal Grandmother      No Known Problems Paternal Grandfather      No Known Problems Maternal Grandmother      No Known Problems Maternal Grandfather       Social History     Socioeconomic History    Marital status:    Tobacco Use    Smoking status: Former     Types: Cigarettes    Smokeless tobacco: Never    Tobacco comments:     Stopped smoking 18 years ago.    Substance and Sexual Activity    Alcohol use: Not Currently     Comment: Drinks 2 margaritas    Drug use: Never    Sexual activity: Not Currently     Outpatient Encounter Medications as of 5/29/2024   Medication Sig Dispense Refill    alfuzosin (UROXATRAL) 10 mg Tb24 Take 10 mg by mouth daily with breakfast.      amoxicillin (AMOXIL) 500 MG Tab Take 500 mg by mouth. Takes it every 3 to 4 months prior to dental appointment      aspirin 81 MG Chew Take 1 tablet by mouth every morning.      atorvastatin (LIPITOR) 80 MG tablet Take 80 mg by mouth every evening.      cyanocobalamin (VITAMIN B-12) 1000 MCG tablet Take 500 mcg by mouth once daily.      dicyclomine (BENTYL) 20 mg tablet Take 20 mg by mouth 2 (two) times daily.      EScitalopram oxalate (LEXAPRO) 10 MG  "tablet Take 10 mg by mouth every evening.      gabapentin (NEURONTIN) 300 MG capsule Take 300 mg by mouth 2 (two) times daily.      HYDROcodone-acetaminophen (NORCO) 7.5-325 mg per tablet Take 1 tablet by mouth 2 (two) times a day.      lenalidomide 5 mg Cap Take 5 mg by mouth.      magnesium gluconate (MAG-G) 27 mg magnesium (500 mg) Tab tablet Take 1,000 mg by mouth once daily.      nitroGLYCERIN (NITROSTAT) 0.4 MG SL tablet Place 0.4 mg under the tongue every 5 (five) minutes as needed.      omega-3 acid ethyl esters (LOVAZA) 1 gram capsule Take 1 capsule by mouth every morning.      pantoprazole (PROTONIX) 40 MG tablet Take 40 mg by mouth once daily.      valACYclovir (VALTREX) 500 MG tablet Take 500 mg by mouth once daily.      vitamin D (VITAMIN D3) 1000 units Tab Take 1,000 Units by mouth once daily.       No facility-administered encounter medications on file as of 5/29/2024.        Review of Systems   Constitutional:  Negative for chills and fever.   Respiratory:  Negative for shortness of breath.    Cardiovascular:  Negative for chest pain.   Gastrointestinal:  Negative for nausea and vomiting.   Genitourinary:  Positive for hematuria. Negative for difficulty urinating, dysuria, frequency and urgency.   Musculoskeletal:  Negative for back pain.   Skin:  Negative for rash.   Neurological:  Negative for dizziness.   Psychiatric/Behavioral:  Negative for agitation.        Objective:     /76 (BP Location: Left arm, Patient Position: Sitting)   Pulse 64   Resp 16   Ht 5' 11" (1.803 m)   Wt 111.1 kg (244 lb 14.9 oz)   BMI 34.16 kg/m²     Physical Exam  Constitutional:       Appearance: Normal appearance.   Pulmonary:      Effort: Pulmonary effort is normal.   Abdominal:      Palpations: Abdomen is soft.   Neurological:      Mental Status: He is alert and oriented to person, place, and time.   Psychiatric:         Mood and Affect: Mood normal.         Office Visit on 05/29/2024   Component Date Value " Ref Range Status    POC Blood, Urine 05/29/2024 Positive (A)  Negative Final    POC Bilirubin, Urine 05/29/2024 Negative  Negative Final    POC Urobilinogen, Urine 05/29/2024 0.2 (A)  0.3 - 2.2 Final    POC Ketones, Urine 05/29/2024 Negative  Negative Final    POC Protein, Urine 05/29/2024 Positive (A)  Negative Final    POC Nitrates, Urine 05/29/2024 Negative  Negative Final    POC Glucose, Urine 05/29/2024 Negative  Negative Final    pH, UA 05/29/2024 5.0   Final    POC Specific Gravity, Urine 05/29/2024 >=1.030  1.003 - 1.029 Final    POC Leukocytes, Urine 05/29/2024 Positive (A)  Negative Final        Results for orders placed or performed in visit on 03/04/24 (from the past 8760 hour(s))   POCT Bladder Scan   Result Value    POC Residual Urine Volume 56        Assessment:       1. Acute cystitis with hematuria    2. Intermittent gross hematuria    3. BARBIE (stress urinary incontinence), male      Plan:     Orders Placed This Encounter    CULTURE, URINE    POCT Urinalysis, Dipstick, Automated, W/O Scope          4-16-24  CT urogram.  See report.  Kidneys demonstrate no enhancing mass with cysts present bilaterally including left peripelvic cysts. No hydronephrosis. Prominent right extrarenal pelvis. No nephrolithiasis. No distal urolithiasis. Opacified ureters and renal collecting systems demonstrate no persistent suspicious filling defect. No definite ureteral wall thickening.  Mild bladder base wall thickening which may be accentuated by incomplete distension and prostatectomy. Correlate with direct visualization.      I reviewed all of the above lab results.            3-4-24  PSA <0.01     4-15-24  Cr 0.9     I reviewed all of the above lab results.         Assessment:  - Cystitis.  - Mild intermittent gross hematuria that he says has improved since his cystoscopy with bladder biopsies with fulguration procedure on 5-3-24.  - Intermittent gross hematuria over the last few months.  He says he smoked about 3  years as a teenager.  He takes aspirin 81 mg daily.  - S/p cystoscopy with bladder biopsies with fulguration of an approximately 1.5 cm flat erythematous lesion was seen in the midline of the posterior wall of his bladder and normal bilateral retrograde pyelograms on 5-3-24.  Path:  Atypical urothelial mucosa with chronic inflammation and partial denudation, favor reactive changes Muscularis propria is present.  - Prostate cancer s/p a robotic radical prostatectomy and robotic bilateral pelvic lymphadenectomy by me on 9-20-21.  Path:  Prostate adenocarcinoma thomas 3+4=7, 8% pattern 4, margins involved at the left lateral prostate (unifocal pattern 3 at the positive margin), perineural invasion is present, pT2N0.    - Detectable PSA of 1.440 on 1-5-24.  Pelvic MRI on 2-1-22 showed no soft tissue mass or nodule in the surgical bed.  No evidence for disease recurrence or lymphadenopathy.  PET/CT pylarify scan on 2-16-22 showed a suspicious 9 mm hypermetabolic left pelvic lymph node.  - He underwent pelvic radiation treatments with Dr Donaldson that he finished in May 2022.  He received his first lupron 22.5 mg IM injection on 2-25-22 and his final lupron 22.5 mg IM injection on 6-13-22.  - Mild BARBIE.  He goes through 2 diaper daily.  - ED.  I prescribed him tri-mix ICI on 3-4-24.  He says he did not get the tri-mix ICI filled because his wife is not going to let him use it.  - CAD.  He has 2 coronary stents.  He takes aspirin 81 mg daily.  Dr Robbie Castillo is his Cardiologist.  - History of a 6 mm right proximal ureteral stone s/p right USE with laser lithotriopsy with right ureteral stent exchange on 2-8-19.  His right ureteral stent was removed on 2-20-9.  Stone analysis on 2-8-19 was 100% uric acid.  - History of kidney stones.    Plan:  - Urine culture today.  - The mutual decision was made to hold off on starting him on an antibiotic right now and to wait on the urine culture result.  Plan:  - He says he has an  appointment at [facility] pelvic floor physical therapy in early June 2024 for his BARBIE, and I encouraged him to keep this appointment.  - I discussed dietary modifications with him today and I recommended he drink mostly water during the day.   - I ordered a PSA to be done few days prior to his appointment that is scheduled for 9-18-24.

## 2024-06-05 ENCOUNTER — TELEPHONE (OUTPATIENT)
Dept: UROLOGY | Facility: CLINIC | Age: 69
End: 2024-06-05
Payer: MEDICARE

## 2024-06-05 DIAGNOSIS — R31.0 INTERMITTENT GROSS HEMATURIA: Primary | ICD-10-CM

## 2024-06-05 NOTE — TELEPHONE ENCOUNTER
Raj Davis Letitia, would you mind reviewing his urine culture, please advise treatment plan. Thank you    ----- Message from Damaris Esparza sent at 6/5/2024 12:53 PM CDT -----  Contact: Steven  Patient is calling to get results from culture. Please callback [phone] to assist

## 2024-06-06 ENCOUNTER — LAB VISIT (OUTPATIENT)
Dept: LAB | Facility: HOSPITAL | Age: 69
End: 2024-06-06
Attending: UROLOGY
Payer: MEDICARE

## 2024-06-06 DIAGNOSIS — R31.0 INTERMITTENT GROSS HEMATURIA: ICD-10-CM

## 2024-06-06 PROCEDURE — 87086 URINE CULTURE/COLONY COUNT: CPT | Performed by: UROLOGY

## 2024-06-06 PROCEDURE — 87088 URINE BACTERIA CULTURE: CPT | Performed by: UROLOGY

## 2024-06-06 PROCEDURE — 87077 CULTURE AEROBIC IDENTIFY: CPT | Performed by: UROLOGY

## 2024-06-06 PROCEDURE — 87186 SC STD MICRODIL/AGAR DIL: CPT | Performed by: UROLOGY

## 2024-06-08 LAB — BACTERIA UR CULT: ABNORMAL

## 2024-06-10 ENCOUNTER — PATIENT MESSAGE (OUTPATIENT)
Dept: UROLOGY | Facility: CLINIC | Age: 69
End: 2024-06-10
Payer: MEDICARE

## 2024-06-13 ENCOUNTER — TELEPHONE (OUTPATIENT)
Dept: UROLOGY | Facility: CLINIC | Age: 69
End: 2024-06-13
Payer: MEDICARE

## 2024-06-13 NOTE — TELEPHONE ENCOUNTER
Patient called stating that he went to [facility] due to hematuria that was very dark, has not subsided to light pink. Patient is also complaining of horrible bladder spasms requesting medication and would also like to talk with Dr. An. Will notify Dr. An.

## 2024-06-13 NOTE — TELEPHONE ENCOUNTER
----- Message from Charlieantoinekarol Tobin sent at 6/13/2024  3:52 PM CDT -----  Contact: [phone]      Type:  Needs Medical Advice    Who Called: Steven   Symptoms (please be specific):  Bladder Spasms    How long has patient had these symptoms: about a week  Pharmacy name and phone #:    [id number] - [place] - [address] AT [place] [address] [address]  [facility] [address]  Phone: [phone] Fax: [phone]   Would the patient rather a call back or a response via [facility]? Call back   Best Call Back Number: [phone]   Additional Information: patient is currently in the hospital at [facility] and is requesting to speak with the Doctor regarding his condition.        Thanks KB

## 2024-07-09 ENCOUNTER — TELEPHONE (OUTPATIENT)
Dept: UROLOGY | Facility: HOSPITAL | Age: 69
End: 2024-07-09
Payer: MEDICARE

## 2024-07-09 ENCOUNTER — LAB VISIT (OUTPATIENT)
Dept: LAB | Facility: HOSPITAL | Age: 69
End: 2024-07-09
Attending: UROLOGY
Payer: MEDICARE

## 2024-07-09 DIAGNOSIS — N30.01 ACUTE CYSTITIS WITH HEMATURIA: Primary | ICD-10-CM

## 2024-07-09 DIAGNOSIS — N30.01 ACUTE CYSTITIS WITH HEMATURIA: ICD-10-CM

## 2024-07-09 PROCEDURE — 87086 URINE CULTURE/COLONY COUNT: CPT | Performed by: UROLOGY

## 2024-07-09 NOTE — TELEPHONE ENCOUNTER
Mr Garcia called me today and said he had blood in his urine yesterday and he is concerned he could have another UTI.  Denies burning with urination.  I recommended a urine culture today at the lab and I placed a urine culture order.  I recommended he drink plenty of water and rest.  The mutual decision was made to hold off on an antibiotic and to wait for the urine culture result.  I answered all of his questions to his apparent understanding and satisfaction.  
How Severe Is Your Port Wine Stain?: mild

## 2024-07-11 LAB
BACTERIA UR CULT: NORMAL
BACTERIA UR CULT: NORMAL

## 2024-07-12 ENCOUNTER — TELEPHONE (OUTPATIENT)
Dept: UROLOGY | Facility: CLINIC | Age: 69
End: 2024-07-12
Payer: MEDICARE

## 2024-07-12 NOTE — TELEPHONE ENCOUNTER
.Outgoing call placed to patient, patient verified name and date of birth, patient stated the he spoke with Dr. An and someone else from our nursing staff regarding this already but he is currently ok, no further blood in his urine and no symptoms of urinary infection at this time. He stated he will contact us if he has any of that so he can complete an urine culture if needed.      ----- Message from Thong An MD sent at 7/11/2024 12:58 PM CDT -----  Please call Mr Garcia and let him know that I reviewed his urine culture result and it grew multiple organisms.  Most of the time in this situation the urine sample was not a good mid stream urine sample.  Ask him if he collected a good mid stream urine sample.  Ask him if he is having any further blood in his urine, any burning with urination, and/or any other symptoms of a urinary infection.  Ask him if he has any questions.  Let me know what he says.

## 2024-09-13 ENCOUNTER — LAB VISIT (OUTPATIENT)
Dept: LAB | Facility: HOSPITAL | Age: 69
End: 2024-09-13
Attending: UROLOGY
Payer: MEDICARE

## 2024-09-13 DIAGNOSIS — C61 PROSTATE CANCER: ICD-10-CM

## 2024-09-13 LAB — COMPLEXED PSA SERPL-MCNC: <0.01 NG/ML (ref 0–4)

## 2024-09-13 PROCEDURE — 84153 ASSAY OF PSA TOTAL: CPT | Performed by: UROLOGY

## 2024-09-13 PROCEDURE — 36415 COLL VENOUS BLD VENIPUNCTURE: CPT | Performed by: UROLOGY

## 2024-09-18 ENCOUNTER — OFFICE VISIT (OUTPATIENT)
Dept: UROLOGY | Facility: CLINIC | Age: 69
End: 2024-09-18
Payer: MEDICARE

## 2024-09-18 VITALS
DIASTOLIC BLOOD PRESSURE: 75 MMHG | HEART RATE: 65 BPM | BODY MASS INDEX: 34.29 KG/M2 | WEIGHT: 244.94 LBS | SYSTOLIC BLOOD PRESSURE: 127 MMHG | HEIGHT: 71 IN

## 2024-09-18 DIAGNOSIS — C61 PROSTATE CANCER: ICD-10-CM

## 2024-09-18 DIAGNOSIS — N52.31 ERECTILE DYSFUNCTION FOLLOWING RADICAL PROSTATECTOMY: ICD-10-CM

## 2024-09-18 DIAGNOSIS — N39.0 RECURRENT UTI: ICD-10-CM

## 2024-09-18 DIAGNOSIS — N39.3 SUI (STRESS URINARY INCONTINENCE), MALE: ICD-10-CM

## 2024-09-18 DIAGNOSIS — N30.40 RADIATION CYSTITIS: Primary | ICD-10-CM

## 2024-09-18 LAB
BILIRUB UR QL STRIP: NEGATIVE
GLUCOSE UR QL STRIP: NEGATIVE
KETONES UR QL STRIP: NEGATIVE
LEUKOCYTE ESTERASE UR QL STRIP: NEGATIVE
PH, POC UA: 5.5
POC BLOOD, URINE: NEGATIVE
POC NITRATES, URINE: NEGATIVE
PROT UR QL STRIP: POSITIVE
SP GR UR STRIP: 1.03 (ref 1–1.03)
UROBILINOGEN UR STRIP-ACNC: 0.2 (ref 0.3–2.2)

## 2024-09-18 PROCEDURE — 99214 OFFICE O/P EST MOD 30 MIN: CPT | Mod: S$PBB,,, | Performed by: UROLOGY

## 2024-09-18 PROCEDURE — 99214 OFFICE O/P EST MOD 30 MIN: CPT | Mod: PBBFAC | Performed by: UROLOGY

## 2024-09-18 PROCEDURE — 81003 URINALYSIS AUTO W/O SCOPE: CPT | Mod: PBBFAC | Performed by: UROLOGY

## 2024-09-18 PROCEDURE — 99999 PR PBB SHADOW E&M-EST. PATIENT-LVL IV: CPT | Mod: PBBFAC,,, | Performed by: UROLOGY

## 2024-09-18 PROCEDURE — 99999PBSHW POCT URINALYSIS, DIPSTICK, AUTOMATED, W/O SCOPE: Mod: PBBFAC,,,

## 2024-09-18 NOTE — PROGRESS NOTES
Subjective:       Patient ID: Steven Garcia is a 69 y.o. male.    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  He says his PCP treated him for a UTI in August 2024.  He does not have any current symptoms of a UTI.  UA today on 9-18-24 does not show evidence of a urinary infection.  No gross hematuria.  No dysuria.  He has mild BARBIE.  He goes through 2 diapers in 24 hours.  He has ED.  I prescribed him tri-mix ICI on 3-4-24.  He  decided not to get the tri-mix ICI filled.    He is s/p cystoscopy with bladder biopsies with fulguration of an approximately 1.5 cm flat erythematous lesion was seen in the midline of the posterior wall of his bladder and normal bilateral retrograde pyelograms on 5-3-24.  Path:  Atypical urothelial mucosa with chronic inflammation and partial denudation, favor reactive changes Muscularis propria is present.    He has a history of mild intermittent gross hematuria that has improved since his cystoscopy with bladder biopsies with fulguration procedure on 5-3-24.  No recent gross hematuria.  He takes aspirin 81 mg daily and fish oil.  He is not a smoker.  He smoked about 3 years as a teenager.      He underwent a robotic radical prostatectomy and robotic bilateral pelvic lymphadenectomy by me on 9-20-21.  Path:  Prostate adenocarcinoma thomas 3+4=7, 8% pattern 4, margins involved at the left lateral prostate (unifocal pattern 3 at the positive margin), perineural invasion is present, pT2N0.      He had a detectable PSA of 1.440 on 1-5-24.  Pelvic MRI on 2-1-22 showed no soft tissue mass or nodule in the surgical bed.  No evidence for disease recurrence or lymphadenopathy.  PET/CT pylarify scan on 2-16-22 showed a suspicious 9 mm hypermetabolic left pelvic lymph node.    He underwent pelvic radiation treatments with Dr Donaldson that he finished in May 2022.  He received his first lupron 22.5 mg  IM injection on 2-25-22 and his final lupron 22.5 mg IM injection on 6-13-22.  His most recent PSA is <0.01 on 9-13-24.         Past Medical History:   Diagnosis Date    CAD (coronary artery disease)     Cancer     jaw    Encounter for blood transfusion     platelets    Heart attack     Had one in July, one in November 2016 or 2017.    History of heart artery stent     Currently has two stents and a balloon.    HLD (hyperlipidemia)     Hx of cholecystectomy     Had when he was 17 yrs old    Multiple myeloma     radiation treated/ chemo    Sleep apnea      Family History   Problem Relation Name Age of Onset    No Known Problems Father      No Known Problems Mother      No Known Problems Maternal Aunt      No Known Problems Maternal Uncle      No Known Problems Paternal Aunt      No Known Problems Paternal Uncle      No Known Problems Paternal Grandmother      No Known Problems Paternal Grandfather      No Known Problems Maternal Grandmother      No Known Problems Maternal Grandfather       Social History     Socioeconomic History    Marital status:    Tobacco Use    Smoking status: Former     Types: Cigarettes    Smokeless tobacco: Never    Tobacco comments:     Stopped smoking 18 years ago.    Substance and Sexual Activity    Alcohol use: Not Currently     Comment: Drinks 2 margaritas    Drug use: Never    Sexual activity: Not Currently     Outpatient Encounter Medications as of 9/18/2024   Medication Sig Dispense Refill    alfuzosin (UROXATRAL) 10 mg Tb24 Take 10 mg by mouth daily with breakfast.      amoxicillin (AMOXIL) 500 MG Tab Take 500 mg by mouth. Takes it every 3 to 4 months prior to dental appointment      aspirin 81 MG Chew Take 1 tablet by mouth every morning.      atorvastatin (LIPITOR) 80 MG tablet Take 80 mg by mouth every evening.      cyanocobalamin (VITAMIN B-12) 1000 MCG tablet Take 500 mcg by mouth once daily.      dicyclomine (BENTYL) 20 mg tablet Take 20 mg by mouth 2 (two) times daily.   "    EScitalopram oxalate (LEXAPRO) 10 MG tablet Take 10 mg by mouth every evening.      gabapentin (NEURONTIN) 300 MG capsule Take 300 mg by mouth 2 (two) times daily.      HYDROcodone-acetaminophen (NORCO) 7.5-325 mg per tablet Take 1 tablet by mouth 2 (two) times a day.      lenalidomide 5 mg Cap Take 5 mg by mouth.      magnesium gluconate (MAG-G) 27 mg magnesium (500 mg) Tab tablet Take 1,000 mg by mouth once daily.      nitroGLYCERIN (NITROSTAT) 0.4 MG SL tablet Place 0.4 mg under the tongue every 5 (five) minutes as needed.      omega-3 acid ethyl esters (LOVAZA) 1 gram capsule Take 1 capsule by mouth every morning.      pantoprazole (PROTONIX) 40 MG tablet Take 40 mg by mouth once daily.      valACYclovir (VALTREX) 500 MG tablet Take 500 mg by mouth once daily.      vitamin D (VITAMIN D3) 1000 units Tab Take 1,000 Units by mouth once daily.       No facility-administered encounter medications on file as of 9/18/2024.        Review of Systems   Constitutional:  Negative for chills and fever.   Respiratory:  Negative for shortness of breath.    Cardiovascular:  Negative for chest pain.   Gastrointestinal:  Negative for nausea and vomiting.   Genitourinary:  Negative for difficulty urinating, dysuria and hematuria.   Musculoskeletal:  Negative for back pain.   Skin:  Negative for rash.   Neurological:  Negative for dizziness.   Psychiatric/Behavioral:  Negative for agitation.        Objective:     /75   Pulse 65   Ht 5' 11" (1.803 m)   Wt 111.1 kg (244 lb 14.9 oz)   BMI 34.16 kg/m²     Physical Exam  Constitutional:       Appearance: Normal appearance.   Pulmonary:      Effort: Pulmonary effort is normal.   Abdominal:      Palpations: Abdomen is soft.   Neurological:      Mental Status: He is alert and oriented to person, place, and time.   Psychiatric:         Mood and Affect: Mood normal.         Office Visit on 09/18/2024   Component Date Value Ref Range Status    POC Blood, Urine 09/18/2024 " Negative  Negative Final    POC Bilirubin, Urine 09/18/2024 Negative  Negative Final    POC Urobilinogen, Urine 09/18/2024 0.2 (A)  0.3 - 2.2 Final    POC Ketones, Urine 09/18/2024 Negative  Negative Final    POC Protein, Urine 09/18/2024 Positive (A)  Negative Final    POC Nitrates, Urine 09/18/2024 Negative  Negative Final    POC Glucose, Urine 09/18/2024 Negative  Negative Final    pH, UA 09/18/2024 5.5   Final    POC Specific Gravity, Urine 09/18/2024 1.030 (A)  1.003 - 1.029 Final    POC Leukocytes, Urine 09/18/2024 Negative  Negative Final        Results for orders placed or performed in visit on 03/04/24 (from the past 8760 hour(s))   POCT Bladder Scan   Result Value    POC Residual Urine Volume 56        Assessment:       1. Radiation cystitis    2. Prostate cancer    3. BARBIE (stress urinary incontinence), male    4. Recurrent UTI    5. Erectile dysfunction following radical prostatectomy      Plan:     Orders Placed This Encounter    PROSTATE SPECIFIC ANTIGEN, DIAGNOSTIC    POCT Urinalysis, Dipstick, Automated, W/O Scope          4-16-24  CT urogram.  See report.  Kidneys demonstrate no enhancing mass with cysts present bilaterally including left peripelvic cysts. No hydronephrosis. Prominent right extrarenal pelvis. No nephrolithiasis. No distal urolithiasis. Opacified ureters and renal collecting systems demonstrate no persistent suspicious filling defect. No definite ureteral wall thickening.  Mild bladder base wall thickening which may be accentuated by incomplete distension and prostatectomy. Correlate with direct visualization.      I reviewed all of the above lab results.           9-13-24  PSA <0.01     3-4-24  PSA <0.01     4-15-24  Cr 0.9    8-14-24  Urine culture.  E coli.    7-9-24  Urine culture.  Multiple organisms isolated. None in predominance.       6-6-24  Urine culture.  Klebsiella.     I reviewed all of the above lab results.            Assessment:  - Radiation cystitis.  - Recurrent  UTIs.  UA today on 9-18-24 does not show evidence of a urinary infection.  - History of mild intermittent gross hematuria that has improved since his cystoscopy with bladder biopsies with fulguration procedure on 5-3-24.  No recent gross hematuria.  He takes aspirin 81 mg daily and fish oil.  He is not a smoker.  He smoked about 3 years as a teenager.    - S/p cystoscopy with bladder biopsies with fulguration of an approximately 1.5 cm flat erythematous lesion was seen in the midline of the posterior wall of his bladder and normal bilateral retrograde pyelograms on 5-3-24.  Path:  Atypical urothelial mucosa with chronic inflammation and partial denudation, favor reactive changes Muscularis propria is present.  - Prostate cancer s/p a robotic radical prostatectomy and robotic bilateral pelvic lymphadenectomy by me on 9-20-21.  Path:  Prostate adenocarcinoma thomas 3+4=7, 8% pattern 4, margins involved at the left lateral prostate (unifocal pattern 3 at the positive margin), perineural invasion is present, pT2N0.    - Detectable PSA of 1.440 on 1-5-24.  Pelvic MRI on 2-1-22 showed no soft tissue mass or nodule in the surgical bed.  No evidence for disease recurrence or lymphadenopathy.  PET/CT pylarify scan on 2-16-22 showed a suspicious 9 mm hypermetabolic left pelvic lymph node.  - He underwent pelvic radiation treatments with Dr Donaldson that he finished in May 2022.  He received his first lupron 22.5 mg IM injection on 2-25-22 and his final lupron 22.5 mg IM injection on 6-13-22.  His most recent PSA is <0.01 on 9-13-24.  - Mild BARBIE.  He goes through 2 diapers in 24 hours.  - ED.  I prescribed him tri-mix ICI on 3-4-24.  He  decided not to get the tri-mix ICI filled.  - CAD.  He has 2 coronary stents.  He takes aspirin 81 mg daily.  Dr Robbie Castillo is his Cardiologist.  - History of a 6 mm right proximal ureteral stone s/p right USE with laser lithotriopsy with right ureteral stent exchange on 2-8-19.  His right  ureteral stent was removed on 2-20-9.  Stone analysis on 2-8-19 was 100% uric acid.  - History of kidney stones.     Plan:  - I recommended he work on the pelvic floor exercises that he learned at pelvic floor PT at [facility] and [facility] 3 to 4 days weekly.  - He is not interested in treatment of his ED at this time.  - I discussed dietary modifications with him today and I recommended he drink mostly water during the day.   - I told him to notify the office if he has symptoms of a urinary infection or if he has blood in his urine in the future.  - PSA in 6 months few days prior to a 6 month appointment.

## 2024-11-23 RX ORDER — AMOXICILLIN AND CLAVULANATE POTASSIUM 875; 125 MG/1; MG/1
1 TABLET, FILM COATED ORAL 2 TIMES DAILY
Qty: 20 TABLET | Refills: 0 | Status: SHIPPED | OUTPATIENT
Start: 2024-11-23

## 2025-03-21 ENCOUNTER — LAB VISIT (OUTPATIENT)
Dept: LAB | Facility: HOSPITAL | Age: 70
End: 2025-03-21
Attending: UROLOGY
Payer: MEDICARE

## 2025-03-21 DIAGNOSIS — C61 PROSTATE CANCER: ICD-10-CM

## 2025-03-21 LAB — COMPLEXED PSA SERPL-MCNC: <0.01 NG/ML (ref 0–4)

## 2025-03-21 PROCEDURE — 36415 COLL VENOUS BLD VENIPUNCTURE: CPT | Performed by: UROLOGY

## 2025-03-21 PROCEDURE — 84153 ASSAY OF PSA TOTAL: CPT | Performed by: UROLOGY

## 2025-03-27 ENCOUNTER — OFFICE VISIT (OUTPATIENT)
Dept: UROLOGY | Facility: CLINIC | Age: 70
End: 2025-03-27
Payer: MEDICARE

## 2025-03-27 VITALS
HEART RATE: 69 BPM | BODY MASS INDEX: 34.26 KG/M2 | HEIGHT: 71 IN | SYSTOLIC BLOOD PRESSURE: 154 MMHG | WEIGHT: 244.69 LBS | DIASTOLIC BLOOD PRESSURE: 92 MMHG

## 2025-03-27 DIAGNOSIS — N52.31 ERECTILE DYSFUNCTION FOLLOWING RADICAL PROSTATECTOMY: ICD-10-CM

## 2025-03-27 DIAGNOSIS — C61 PROSTATE CANCER: ICD-10-CM

## 2025-03-27 DIAGNOSIS — N39.3 SUI (STRESS URINARY INCONTINENCE), MALE: ICD-10-CM

## 2025-03-27 DIAGNOSIS — N30.40 RADIATION CYSTITIS: Primary | ICD-10-CM

## 2025-03-27 LAB
BILIRUB UR QL STRIP: NEGATIVE
GLUCOSE UR QL STRIP: NEGATIVE
KETONES UR QL STRIP: NEGATIVE
LEUKOCYTE ESTERASE UR QL STRIP: NEGATIVE
PH, POC UA: 5.5
POC BLOOD, URINE: NEGATIVE
POC NITRATES, URINE: NEGATIVE
PROT UR QL STRIP: NEGATIVE
SP GR UR STRIP: 1.02 (ref 1–1.03)
UROBILINOGEN UR STRIP-ACNC: 0.2 (ref 0.3–2.2)

## 2025-03-27 PROCEDURE — 99214 OFFICE O/P EST MOD 30 MIN: CPT | Mod: S$PBB,,, | Performed by: UROLOGY

## 2025-03-27 PROCEDURE — 99999PBSHW POCT URINALYSIS, DIPSTICK, AUTOMATED, W/O SCOPE: Mod: PBBFAC,,,

## 2025-03-27 PROCEDURE — 99214 OFFICE O/P EST MOD 30 MIN: CPT | Mod: PBBFAC | Performed by: UROLOGY

## 2025-03-27 PROCEDURE — 99999 PR PBB SHADOW E&M-EST. PATIENT-LVL IV: CPT | Mod: PBBFAC,,, | Performed by: UROLOGY

## 2025-03-27 PROCEDURE — 81003 URINALYSIS AUTO W/O SCOPE: CPT | Mod: PBBFAC | Performed by: UROLOGY

## 2025-03-27 NOTE — PROGRESS NOTES
Subjective:       Patient ID: Steven Garcia is a 69 y.o. male.    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  He has not had blood in his urine in over 6 months.  His UA today on 3-27-25 is normal and shows no MH.  He has a history of mild intermittent gross hematuria that has improved since his cystoscopy with bladder biopsies with fulguration procedure on 5-3-24.  He is not a smoker.  He smoked about 3 years as a teenager.  He takes eliquis and aspirin 81 mg daily.  He underwent a cystoscopy with bladder biopsies with fulguration of an approximately 1.5 cm flat erythematous lesion was seen in the midline of the posterior wall of his bladder and normal bilateral retrograde pyelograms on 5-3-24.  Path:  Atypical urothelial mucosa with chronic inflammation and partial denudation, favor reactive changes Muscularis propria is present.    Mr Garcia underwent a robotic radical prostatectomy and robotic bilateral pelvic lymphadenectomy by me on 9-20-21.  Path:  Prostate adenocarcinoma thomas 3+4=7, 8% pattern 4, margins involved at the left lateral prostate (unifocal pattern 3 at the positive margin), perineural invasion is present, pT2N0.      He had a detectable PSA of 1.440 on 1-5-24.  Pelvic MRI on 2-1-22 showed no soft tissue mass or nodule in the surgical bed.  No evidence for disease recurrence or lymphadenopathy.  PET/CT pylarify scan on 2-16-22 showed a suspicious 9 mm hypermetabolic left pelvic lymph node.  He underwent pelvic radiation treatments with Dr Donaldson that he finished in May 2022.  He received his first lupron 22.5 mg IM injection on 2-25-22 and his final lupron 22.5 mg IM injection on 6-13-22.  His most recent PSA is <0.01 on 9-13-24.    He has mild BARBIE.  He goes through 2 diapers in 24 hours.  He has ED.  I prescribed him tri-mix ICI on 3-4-24.  He decided not to get the tri-mix ICI filled.  He is not on any ED treatment.         Past  "Medical History:   Diagnosis Date    CAD (coronary artery disease)     Cancer     jaw    Encounter for blood transfusion     platelets    Heart attack     Had one in July, one in November 2016 or 2017.    History of heart artery stent     Currently has two stents and a balloon.    HLD (hyperlipidemia)     Hx of cholecystectomy     Had when he was 17 yrs old    Multiple myeloma     radiation treated/ chemo    Sleep apnea      Family History   Problem Relation Name Age of Onset    No Known Problems Father      No Known Problems Mother      No Known Problems Maternal Aunt      No Known Problems Maternal Uncle      No Known Problems Paternal Aunt      No Known Problems Paternal Uncle      No Known Problems Paternal Grandmother      No Known Problems Paternal Grandfather      No Known Problems Maternal Grandmother      No Known Problems Maternal Grandfather       Social History[1]  Encounter Medications[2]     Review of Systems   Constitutional:  Negative for chills and fever.   Respiratory:  Negative for shortness of breath.    Cardiovascular:  Negative for chest pain.   Gastrointestinal:  Negative for nausea and vomiting.   Genitourinary:  Negative for difficulty urinating, dysuria and hematuria.   Musculoskeletal:  Negative for back pain.   Skin:  Negative for rash.   Neurological:  Negative for dizziness.   Psychiatric/Behavioral:  Negative for agitation.        Objective:     BP (!) 154/92   Pulse 69   Ht 5' 11" (1.803 m)   Wt 111 kg (244 lb 11.4 oz)   BMI 34.13 kg/m²     Physical Exam  Constitutional:       Appearance: Normal appearance.   Pulmonary:      Effort: Pulmonary effort is normal.   Abdominal:      Palpations: Abdomen is soft.   Neurological:      Mental Status: He is alert and oriented to person, place, and time.   Psychiatric:         Mood and Affect: Mood normal.         No visits with results within 1 Day(s) from this visit.   Latest known visit with results is:   Lab Visit on 03/21/2025 "   Component Date Value Ref Range Status    PSA Diagnostic 03/21/2025 <0.01  0.00 - 4.00 ng/mL Final    Comment: The testing method is a chemiluminescent microparticle immunoassay   manufactured by [employer] and performed on the [employer]   or   [employer] system. Values obtained with different assay manufacturers   for   methods may be different and cannot be used interchangeably.  PSA Expected levels:  Hormonal Therapy: <0.05 ng/ml  Prostatectomy: <0.01 ng/ml  Radiation Therapy: <1.00 ng/ml          No results found for this or any previous visit (from the past 8760 hours).     Assessment:       1. Radiation cystitis    2. BARBIE (stress urinary incontinence), male    3. Prostate cancer    4. Erectile dysfunction following radical prostatectomy        Plan:     Orders Placed This Encounter    Prostate Specific Antigen, Diagnostic          4-16-24  CT urogram.  See report.  Kidneys demonstrate no enhancing mass with cysts present bilaterally including left peripelvic cysts. No hydronephrosis. Prominent right extrarenal pelvis. No nephrolithiasis. No distal urolithiasis. Opacified ureters and renal collecting systems demonstrate no persistent suspicious filling defect. No definite ureteral wall thickening.  Mild bladder base wall thickening which may be accentuated by incomplete distension and prostatectomy. Correlate with direct visualization.      I reviewed all of the above lab results.           3-21-25  PSA <0.01     9-13-24  PSA <0.01     3-4-24  PSA <0.01     3-12-25  Cr 0.92.  GFR 91,     8-14-24  Urine culture.  E coli.     7-9-24  Urine culture.  Multiple organisms isolated. None in predominance.        6-6-24  Urine culture.  Klebsiella.     I reviewed all of the above lab results.            Assessment:  - Radiation cystitis.  He has not had blood in his urine in over 6 months.  UA today on 3-27-25 is normal and shows no MH.  - History of mild intermittent gross hematuria that has improved since  his cystoscopy with bladder biopsies with fulguration procedure on 5-3-24.  No recent gross hematuria.  He takes aspirin 81 mg daily and fish oil.  He is not a smoker.  He smoked about 3 years as a teenager.    - History of a cystoscopy with bladder biopsies with fulguration of an approximately 1.5 cm flat erythematous lesion was seen in the midline of the posterior wall of his bladder and normal bilateral retrograde pyelograms on 5-3-24.  Path:  Atypical urothelial mucosa with chronic inflammation and partial denudation, favor reactive changes Muscularis propria is present.  - Prostate cancer s/p a robotic radical prostatectomy and robotic bilateral pelvic lymphadenectomy by me on 9-20-21.  Path:  Prostate adenocarcinoma thomas 3+4=7, 8% pattern 4, margins involved at the left lateral prostate (unifocal pattern 3 at the positive margin), perineural invasion is present, pT2N0.    - Detectable PSA of 1.440 on 1-5-24.  Pelvic MRI on 2-1-22 showed no soft tissue mass or nodule in the surgical bed.  No evidence for disease recurrence or lymphadenopathy.  PET/CT pylarify scan on 2-16-22 showed a suspicious 9 mm hypermetabolic left pelvic lymph node.  - He underwent pelvic radiation treatments with Dr Donaldson that he finished in May 2022.  He received his first lupron 22.5 mg IM injection on 2-25-22 and his final lupron 22.5 mg IM injection on 6-13-22.  His most recent PSA is <0.01 on 9-13-24.  - Mild BARBIE.  He goes through 2 diapers in 24 hours.  - ED.  I prescribed him tri-mix ICI on 3-4-24.  He decided not to get the tri-mix ICI filled.  He is not on any ED treatment.  - CAD.  He has 2 coronary stents.  He takes aspirin 81 mg daily and eliquis.  Dr Robbie Castillo is his Cardiologist.  - History of a 6 mm right proximal ureteral stone s/p right USE with laser lithotriopsy with right ureteral stent exchange on 2-8-19.  His right ureteral stent was removed on 2-20-19.  Stone analysis on 2-8-19 was 100% uric acid.  - History  of kidney stones.     Plan:  - Continue to work on the pelvic floor exercises that he learned at pelvic floor PT at [facility] and [facility] 3 to 4 days weekly.  - He is not interested in treatment of his ED at this time.  - I discussed dietary modifications with him today and I recommended he drink mostly water during the day.   - I told him to notify the office if he has symptoms of a urinary infection or if he has blood in his urine in the future.  - PSA in 6 months few days prior to a 6 month appointment.                             [1]   Social History  Socioeconomic History    Marital status:    Tobacco Use    Smoking status: Former     Types: Cigarettes    Smokeless tobacco: Never    Tobacco comments:     Stopped smoking 18 years ago.    Substance and Sexual Activity    Alcohol use: Not Currently     Comment: Drinks 2 margaritas    Drug use: Never    Sexual activity: Not Currently   [2]   Outpatient Encounter Medications as of 3/27/2025   Medication Sig Dispense Refill    alfuzosin (UROXATRAL) 10 mg Tb24 Take 10 mg by mouth daily with breakfast.      amoxicillin (AMOXIL) 500 MG Tab Take 500 mg by mouth. Takes it every 3 to 4 months prior to dental appointment      aspirin 81 MG Chew Take 1 tablet by mouth every morning.      atorvastatin (LIPITOR) 80 MG tablet Take 80 mg by mouth every evening.      cyanocobalamin (VITAMIN B-12) 1000 MCG tablet Take 500 mcg by mouth once daily.      dicyclomine (BENTYL) 20 mg tablet Take 20 mg by mouth 2 (two) times daily.      EScitalopram oxalate (LEXAPRO) 10 MG tablet Take 10 mg by mouth every evening.      HYDROcodone-acetaminophen (NORCO) 7.5-325 mg per tablet Take 1 tablet by mouth 2 (two) times a day.      nitroGLYCERIN (NITROSTAT) 0.4 MG SL tablet Place 0.4 mg under the tongue every 5 (five) minutes as needed.      omega-3 acid ethyl esters (LOVAZA) 1 gram capsule Take 1 capsule by mouth every morning.      pantoprazole (PROTONIX) 40 MG tablet Take 40 mg by  mouth once daily.      valACYclovir (VALTREX) 500 MG tablet Take 500 mg by mouth once daily.      vitamin D (VITAMIN D3) 1000 units Tab Take 1,000 Units by mouth once daily.      amoxicillin-clavulanate 875-125mg (AUGMENTIN) 875-125 mg per tablet Take 1 tablet by mouth 2 (two) times daily. 20 tablet 0    gabapentin (NEURONTIN) 300 MG capsule Take 300 mg by mouth 2 (two) times daily.      lenalidomide 5 mg Cap Take 5 mg by mouth.      magnesium gluconate (MAG-G) 27 mg magnesium (500 mg) Tab tablet Take 1,000 mg by mouth once daily.       No facility-administered encounter medications on file as of 3/27/2025.

## (undated) DEVICE — SPONGE COTTON TRAY 4X4IN

## (undated) DEVICE — GOWN POLY REINF BRTH SLV XL

## (undated) DEVICE — SOL NACL IRR 3000ML

## (undated) DEVICE — IRRIGATION SET Y-TYPE TUR/BLAD

## (undated) DEVICE — BOWL STERILE LARGE 32OZ

## (undated) DEVICE — UROVIEW 2600/2800

## (undated) DEVICE — TRAY SKIN SCRUB WET PREMIUM

## (undated) DEVICE — CATH POLLACK OPEN-END FLEXI-TI

## (undated) DEVICE — TOWEL OR DISP STRL BLUE 4/PK

## (undated) DEVICE — GOWN POLY REINF X-LONG XL

## (undated) DEVICE — SYR ONLY LUER LOCK 20CC

## (undated) DEVICE — CANISTER SUCTION JUMBO 12L

## (undated) DEVICE — SOL IRRI STRL WATER 1000ML

## (undated) DEVICE — DRAPE T CYSTOSCOPY STERILE

## (undated) DEVICE — CONTAINER SPECIMEN OR STER 4OZ

## (undated) DEVICE — GLOVE BIOGEL 7.0